# Patient Record
Sex: FEMALE | Race: WHITE | NOT HISPANIC OR LATINO | Employment: OTHER | URBAN - METROPOLITAN AREA
[De-identification: names, ages, dates, MRNs, and addresses within clinical notes are randomized per-mention and may not be internally consistent; named-entity substitution may affect disease eponyms.]

---

## 2023-03-17 DIAGNOSIS — N39.41 URINARY INCONTINENCE, URGE: ICD-10-CM

## 2023-03-17 DIAGNOSIS — N39.41 URINARY INCONTINENCE, URGE: Primary | ICD-10-CM

## 2023-03-17 RX ORDER — SOLIFENACIN SUCCINATE 10 MG/1
10 TABLET, FILM COATED ORAL DAILY
Qty: 30 TABLET | Refills: 11 | Status: SHIPPED | OUTPATIENT
Start: 2023-03-17 | End: 2023-03-17

## 2023-03-17 RX ORDER — SOLIFENACIN SUCCINATE 10 MG/1
TABLET, FILM COATED ORAL
Qty: 90 TABLET | Refills: 3 | Status: SHIPPED | OUTPATIENT
Start: 2023-03-17 | End: 2023-11-25

## 2023-03-22 DIAGNOSIS — Z00.00 ROUTINE GENERAL MEDICAL EXAMINATION AT A HEALTH CARE FACILITY: Primary | ICD-10-CM

## 2023-03-22 RX ORDER — HYDROCHLOROTHIAZIDE 25 MG/1
TABLET ORAL
Qty: 90 TABLET | Refills: 3 | Status: SHIPPED | OUTPATIENT
Start: 2023-03-22 | End: 2024-03-19 | Stop reason: SDUPTHER

## 2023-03-22 RX ORDER — ATORVASTATIN CALCIUM 40 MG/1
TABLET, FILM COATED ORAL
Qty: 90 TABLET | Refills: 3 | Status: SHIPPED | OUTPATIENT
Start: 2023-03-22 | End: 2024-03-19 | Stop reason: SDUPTHER

## 2023-05-17 ENCOUNTER — OFFICE VISIT (OUTPATIENT)
Dept: PRIMARY CARE | Facility: CLINIC | Age: 78
End: 2023-05-17
Payer: MEDICARE

## 2023-05-17 VITALS — DIASTOLIC BLOOD PRESSURE: 72 MMHG | SYSTOLIC BLOOD PRESSURE: 125 MMHG

## 2023-05-17 DIAGNOSIS — R22.0 SCALP LUMP: Primary | ICD-10-CM

## 2023-05-17 PROCEDURE — 99213 OFFICE O/P EST LOW 20 MIN: CPT | Performed by: FAMILY MEDICINE

## 2023-05-17 NOTE — PROGRESS NOTES
Subjective   Patient ID: Jen Johnson is a 78 y.o. female who presents for lump on scalp    HPI   pt has had a recurrent scalp lump with mild tenderness. Pt requested removal      Review of Systems    Objective   /72     Physical Exam  No acute distress. no cervical or axillary lymphadenopathy,  Lungs: CTA b/l, Heart: RRR, there was a 0.8x0.8 cm subcutaneous lump on scalp with mild  tenderness but no  bleeding, Patient walks with a good balance.     Assessment/Plan     Recurrent subcutaneous scalp lump. Surgeon dianne.

## 2023-07-07 ENCOUNTER — HOSPITAL ENCOUNTER (OUTPATIENT)
Dept: DATA CONVERSION | Facility: HOSPITAL | Age: 78
End: 2023-07-07
Attending: SURGERY | Admitting: SURGERY
Payer: MEDICARE

## 2023-07-07 DIAGNOSIS — L72.11 PILAR CYST: ICD-10-CM

## 2023-07-07 DIAGNOSIS — L72.3 SEBACEOUS CYST: ICD-10-CM

## 2023-07-07 DIAGNOSIS — R22.0 LOCALIZED SWELLING, MASS AND LUMP, HEAD: ICD-10-CM

## 2023-07-13 ENCOUNTER — OFFICE VISIT (OUTPATIENT)
Dept: PRIMARY CARE | Facility: CLINIC | Age: 78
End: 2023-07-13
Payer: MEDICARE

## 2023-07-13 VITALS
SYSTOLIC BLOOD PRESSURE: 129 MMHG | RESPIRATION RATE: 14 BRPM | BODY MASS INDEX: 34.33 KG/M2 | HEART RATE: 76 BPM | DIASTOLIC BLOOD PRESSURE: 81 MMHG | WEIGHT: 200 LBS

## 2023-07-13 DIAGNOSIS — E87.6 HYPOKALEMIA: ICD-10-CM

## 2023-07-13 DIAGNOSIS — R05.3 CHRONIC COUGH: ICD-10-CM

## 2023-07-13 DIAGNOSIS — E11.9 TYPE 2 DIABETES MELLITUS WITHOUT COMPLICATION, WITHOUT LONG-TERM CURRENT USE OF INSULIN (MULTI): ICD-10-CM

## 2023-07-13 DIAGNOSIS — Z00.00 ROUTINE MEDICAL EXAM: ICD-10-CM

## 2023-07-13 DIAGNOSIS — I10 PRIMARY HYPERTENSION: ICD-10-CM

## 2023-07-13 DIAGNOSIS — N39.41 URGE INCONTINENCE OF URINE: ICD-10-CM

## 2023-07-13 DIAGNOSIS — E78.00 PURE HYPERCHOLESTEROLEMIA: Primary | ICD-10-CM

## 2023-07-13 PROBLEM — E78.5 HYPERLIPIDEMIA: Status: ACTIVE | Noted: 2023-07-13

## 2023-07-13 PROBLEM — J30.9 ALLERGIC RHINITIS: Status: ACTIVE | Noted: 2023-07-13

## 2023-07-13 PROBLEM — D05.11 DUCTAL CARCINOMA IN SITU (DCIS) OF RIGHT BREAST: Status: ACTIVE | Noted: 2023-07-13

## 2023-07-13 PROCEDURE — 1159F MED LIST DOCD IN RCRD: CPT | Performed by: FAMILY MEDICINE

## 2023-07-13 PROCEDURE — 3074F SYST BP LT 130 MM HG: CPT | Performed by: FAMILY MEDICINE

## 2023-07-13 PROCEDURE — 3079F DIAST BP 80-89 MM HG: CPT | Performed by: FAMILY MEDICINE

## 2023-07-13 PROCEDURE — 99214 OFFICE O/P EST MOD 30 MIN: CPT | Performed by: FAMILY MEDICINE

## 2023-07-13 PROCEDURE — 1160F RVW MEDS BY RX/DR IN RCRD: CPT | Performed by: FAMILY MEDICINE

## 2023-07-13 PROCEDURE — 1170F FXNL STATUS ASSESSED: CPT | Performed by: FAMILY MEDICINE

## 2023-07-13 PROCEDURE — G0439 PPPS, SUBSEQ VISIT: HCPCS | Performed by: FAMILY MEDICINE

## 2023-07-13 PROCEDURE — 1036F TOBACCO NON-USER: CPT | Performed by: FAMILY MEDICINE

## 2023-07-13 RX ORDER — AMLODIPINE BESYLATE 5 MG/1
TABLET ORAL
COMMUNITY
Start: 2023-03-22 | End: 2023-08-15 | Stop reason: SDUPTHER

## 2023-07-13 RX ORDER — TAMOXIFEN CITRATE 20 MG/1
1 TABLET ORAL DAILY
COMMUNITY
Start: 2021-11-09

## 2023-07-13 RX ORDER — POTASSIUM CHLORIDE 1500 MG/1
2 TABLET, EXTENDED RELEASE ORAL DAILY
COMMUNITY
Start: 2022-07-20 | End: 2023-08-15 | Stop reason: SDUPTHER

## 2023-07-13 RX ORDER — AZELASTINE HCL 205.5 UG/1
SPRAY NASAL
COMMUNITY

## 2023-07-13 ASSESSMENT — ACTIVITIES OF DAILY LIVING (ADL)
GROCERY_SHOPPING: INDEPENDENT
MANAGING_FINANCES: INDEPENDENT
TAKING_MEDICATION: INDEPENDENT
BATHING: INDEPENDENT
DOING_HOUSEWORK: INDEPENDENT
DRESSING: INDEPENDENT

## 2023-07-13 ASSESSMENT — PATIENT HEALTH QUESTIONNAIRE - PHQ9
SUM OF ALL RESPONSES TO PHQ9 QUESTIONS 1 AND 2: 0
2. FEELING DOWN, DEPRESSED OR HOPELESS: NOT AT ALL
1. LITTLE INTEREST OR PLEASURE IN DOING THINGS: NOT AT ALL

## 2023-07-13 NOTE — PROGRESS NOTES
Subjective   Patient ID: Jen Johnson is a 78 y.o. female who presents for Medicare Annual Wellness Visit Subsequent (fu).    HPI   Patient has been compliant with taking all  current medications.No polydipsia, polyuria or significant weight changes. No lower extremities skin lesion. No LE numbness or tingling. No blurry vision. No GI upset  or muscle ache while on statin for high lipids. Normal appetite. Dry cough persisted. No GERD symptoms. No wt loss. No CP, HA, dizziness, heart palpitation. No claudication or cold LE.  No LE edema. No imbalance or falls. Good mood. Urge incontinence was controlled. No confusion or dry mouth    Review of Systems    Objective   /81   Pulse 76   Resp 14   Wt 90.7 kg (200 lb)   BMI 34.33 kg/m²     Physical Exam  NAD, well groomed, No sclera icterus. neck: supple, no cervical or axillary lymphadenopathy,  lungs: CTA b/l, heart: RRR, No LE edema, normal pedal pulses, abd: soft, no tenderness, BS+, normal strength and sensation  at bilateral lower extremities. No skin lesions at bilateral feet. Good balance. CNII-XII were grossly intact, good judgment and memory. No depressed mood.    Assessment/Plan     Medicare wellness visit: pt was capable of performing all his ADLs and IADLs. Pt has good memory and cognitive function. pt is obese. Recommend healthy diet and regular exercise. will monitor wt regularly. Advise eye exam by an OD yearly for glaucoma screen and dental exam every 6 months.   will monitor blood pressure, cholesterol levels and weight regularly. Recommend sunscreen application if exposed to the sun. Recommend shingle vaccines. Recommend pt to clear throw rugs at home and keep good lighting at night to avoid falls. Keep hot water for shower below 120F to avoid burn injury. recommend grab bar at bathtub.   pt stated that he had been taking all his medications as prescribed.   breast ca in situ. cont tamoxifen.   DMII, controlled with life style change. Check  A1C, urine albumin.   HTN with low K+, well controlled. Continue BP pills and kcl  Hyperlipidemia on statin. No GI upset or muscle ache. check labs for evaluation. f/u in 6mos  urge incontinence, controlled. cont solifenacin as dir. call if blurry vision, confusion or dry mouth occurs  chronic cough, pt declined to see a pulmonology. No GERD symptoms. Check cxr

## 2023-07-17 ENCOUNTER — LAB (OUTPATIENT)
Dept: LAB | Facility: LAB | Age: 78
End: 2023-07-17
Payer: MEDICARE

## 2023-07-17 DIAGNOSIS — E78.00 PURE HYPERCHOLESTEROLEMIA: ICD-10-CM

## 2023-07-17 DIAGNOSIS — R05.3 CHRONIC COUGH: ICD-10-CM

## 2023-07-17 DIAGNOSIS — I10 PRIMARY HYPERTENSION: ICD-10-CM

## 2023-07-17 DIAGNOSIS — E11.9 TYPE 2 DIABETES MELLITUS WITHOUT COMPLICATION, WITHOUT LONG-TERM CURRENT USE OF INSULIN (MULTI): ICD-10-CM

## 2023-07-17 LAB
ALANINE AMINOTRANSFERASE (SGPT) (U/L) IN SER/PLAS: 28 U/L (ref 7–45)
ALBUMIN (G/DL) IN SER/PLAS: 3.7 G/DL (ref 3.4–5)
ALBUMIN (MG/L) IN URINE: <7 MG/L
ALBUMIN/CREATININE (UG/MG) IN URINE: NORMAL UG/MG CRT (ref 0–30)
ALKALINE PHOSPHATASE (U/L) IN SER/PLAS: 54 U/L (ref 33–136)
ANION GAP IN SER/PLAS: 13 MMOL/L (ref 10–20)
ASPARTATE AMINOTRANSFERASE (SGOT) (U/L) IN SER/PLAS: 26 U/L (ref 9–39)
BILIRUBIN TOTAL (MG/DL) IN SER/PLAS: 0.6 MG/DL (ref 0–1.2)
CALCIUM (MG/DL) IN SER/PLAS: 9.3 MG/DL (ref 8.6–10.3)
CARBON DIOXIDE, TOTAL (MMOL/L) IN SER/PLAS: 28 MMOL/L (ref 21–32)
CHLORIDE (MMOL/L) IN SER/PLAS: 106 MMOL/L (ref 98–107)
CHOLESTEROL (MG/DL) IN SER/PLAS: 108 MG/DL (ref 0–199)
CHOLESTEROL IN HDL (MG/DL) IN SER/PLAS: 33.2 MG/DL
CHOLESTEROL/HDL RATIO: 3.3
CREATININE (MG/DL) IN SER/PLAS: 0.7 MG/DL (ref 0.5–1.05)
CREATININE (MG/DL) IN URINE: 63.5 MG/DL (ref 20–320)
ERYTHROCYTE DISTRIBUTION WIDTH (RATIO) BY AUTOMATED COUNT: 13.3 % (ref 11.5–14.5)
ERYTHROCYTE MEAN CORPUSCULAR HEMOGLOBIN CONCENTRATION (G/DL) BY AUTOMATED: 33.2 G/DL (ref 32–36)
ERYTHROCYTE MEAN CORPUSCULAR VOLUME (FL) BY AUTOMATED COUNT: 92 FL (ref 80–100)
ERYTHROCYTES (10*6/UL) IN BLOOD BY AUTOMATED COUNT: 4.83 X10E12/L (ref 4–5.2)
ESTIMATED AVERAGE GLUCOSE FOR HBA1C: 137 MG/DL
GFR FEMALE: 89 ML/MIN/1.73M2
GLUCOSE (MG/DL) IN SER/PLAS: 102 MG/DL (ref 74–99)
HEMATOCRIT (%) IN BLOOD BY AUTOMATED COUNT: 44.6 % (ref 36–46)
HEMOGLOBIN (G/DL) IN BLOOD: 14.8 G/DL (ref 12–16)
HEMOGLOBIN A1C/HEMOGLOBIN TOTAL IN BLOOD: 6.4 %
LDL: 52 MG/DL (ref 0–99)
LEUKOCYTES (10*3/UL) IN BLOOD BY AUTOMATED COUNT: 6.6 X10E9/L (ref 4.4–11.3)
PLATELETS (10*3/UL) IN BLOOD AUTOMATED COUNT: 217 X10E9/L (ref 150–450)
POTASSIUM (MMOL/L) IN SER/PLAS: 4 MMOL/L (ref 3.5–5.3)
PROTEIN TOTAL: 6 G/DL (ref 6.4–8.2)
SODIUM (MMOL/L) IN SER/PLAS: 143 MMOL/L (ref 136–145)
TRIGLYCERIDE (MG/DL) IN SER/PLAS: 112 MG/DL (ref 0–149)
UREA NITROGEN (MG/DL) IN SER/PLAS: 16 MG/DL (ref 6–23)
VLDL: 22 MG/DL (ref 0–40)

## 2023-07-17 PROCEDURE — 82043 UR ALBUMIN QUANTITATIVE: CPT

## 2023-07-17 PROCEDURE — 80053 COMPREHEN METABOLIC PANEL: CPT

## 2023-07-17 PROCEDURE — 83036 HEMOGLOBIN GLYCOSYLATED A1C: CPT

## 2023-07-17 PROCEDURE — 82570 ASSAY OF URINE CREATININE: CPT

## 2023-07-17 PROCEDURE — 80061 LIPID PANEL: CPT

## 2023-07-17 PROCEDURE — 85027 COMPLETE CBC AUTOMATED: CPT

## 2023-07-17 PROCEDURE — 36415 COLL VENOUS BLD VENIPUNCTURE: CPT

## 2023-07-19 ENCOUNTER — TELEPHONE (OUTPATIENT)
Dept: PRIMARY CARE | Facility: CLINIC | Age: 78
End: 2023-07-19
Payer: MEDICARE

## 2023-07-19 RX ORDER — TAMOXIFEN CITRATE 20 MG/1
TABLET ORAL
OUTPATIENT
Start: 2023-07-19

## 2023-07-19 NOTE — TELEPHONE ENCOUNTER
Patient called for a refill on her Tamoxifen 20 mg 1x daily. Can we send this to [facility] in [place]? Thank you

## 2023-07-26 LAB
COMPLETE PATHOLOGY REPORT: NORMAL
CONVERTED CLINICAL DIAGNOSIS-HISTORY: NORMAL
CONVERTED FINAL DIAGNOSIS: NORMAL
CONVERTED FINAL REPORT PDF LINK TO COPY AND PASTE: NORMAL
CONVERTED GROSS DESCRIPTION: NORMAL

## 2023-08-15 ENCOUNTER — TELEPHONE (OUTPATIENT)
Dept: PRIMARY CARE | Facility: CLINIC | Age: 78
End: 2023-08-15
Payer: MEDICARE

## 2023-08-15 DIAGNOSIS — I10 PRIMARY HYPERTENSION: Primary | ICD-10-CM

## 2023-08-15 DIAGNOSIS — E87.6 HYPOKALEMIA: ICD-10-CM

## 2023-08-15 RX ORDER — POTASSIUM CHLORIDE 1500 MG/1
40 TABLET, EXTENDED RELEASE ORAL DAILY
Qty: 180 TABLET | Refills: 3 | Status: SHIPPED | OUTPATIENT
Start: 2023-08-15 | End: 2023-09-16

## 2023-08-15 RX ORDER — AMLODIPINE BESYLATE 5 MG/1
TABLET ORAL
Qty: 90 TABLET | Refills: 3 | Status: SHIPPED | OUTPATIENT
Start: 2023-08-15 | End: 2024-04-01 | Stop reason: SDUPTHER

## 2023-08-15 NOTE — TELEPHONE ENCOUNTER
Patient needs refills sent to [facility] in [place]    Amlodipine 5 mg 1x daily    Potassium chloride 20 meq 2 tablets daily

## 2023-08-28 ENCOUNTER — TELEPHONE (OUTPATIENT)
Dept: PRIMARY CARE | Facility: CLINIC | Age: 78
End: 2023-08-28
Payer: MEDICARE

## 2023-09-16 DIAGNOSIS — E87.6 HYPOKALEMIA: ICD-10-CM

## 2023-09-16 RX ORDER — POTASSIUM CHLORIDE 1500 MG/1
40 TABLET, EXTENDED RELEASE ORAL DAILY
Qty: 180 TABLET | Refills: 3 | Status: SHIPPED | OUTPATIENT
Start: 2023-09-16

## 2023-09-29 VITALS
DIASTOLIC BLOOD PRESSURE: 79 MMHG | TEMPERATURE: 97.9 F | SYSTOLIC BLOOD PRESSURE: 172 MMHG | HEART RATE: 65 BPM | RESPIRATION RATE: 16 BRPM

## 2023-09-30 NOTE — H&P
History of Present Illness:   History Present Illness:  Reason for surgery: scalp cyst   HPI:    subcutaneousalp cyst   scheduled for excision today     Allergies:        Allergies:  ·  Nickel : Other, Rash  ·  Pollen : Other  ·  Tape  - Adhesive, Bandaids, Paper: Rash, Itching  ·  No Known  Drug Allergies: Unknown, [facility]    Home Medication Review:   Home Medications Reviewed: yes     Impression/Procedure:   ·  Impression and Planned Procedure: scalp cyst   scheduled for excision today       ERAS (Enhanced Recovery After Surgery):  ·  ERAS Patient: no       Vital Signs:  Temperature C: 36.6 degrees C   Temperature F: 97.8 degrees F   Heart Rate: 65 beats per minute   Respiratory Rate: 16 breath per minute   Blood Pressure Systolic: 172 mm/Hg   Blood Pressure Diastolic: 79 mm/Hg     Physical Exam by System:    Respiratory/Thorax: Patent airways, CTAB   Cardiovascular: Regular,     Consent:   COVID-19 Consent:  ·  COVID-19 Risk Consent Surgeon has reviewed key risks related to the risk of santos COVID-19 and if they contract COVID-19 what the risks are.       Electronic Signatures:  Karen Waters)  (Signed 07-Jul-2023 07:47)   Authored: History of Present Illness, Allergies, Home  Medication Review, Impression/Procedure, ERAS, Physical Exam, Consent, Note Completion      Last Updated: 07-Jul-2023 07:47 by Karen Waters)

## 2023-10-02 NOTE — OP NOTE
Post Operative Note:     PreOp Diagnosis: scap cyst   Post-Procedure Diagnosis: same   Procedure: 1. excision of scalp cyst   2.   3.   4.   5.   Surgeon: Evelin   Resident/Fellow/Other Assistant:    Anesthesia: local   Estimated Blood Loss (mL): 2   Specimen: yes   Complications: none   Findings: see below   Patient Returned To/Condition: pacu, stable   Additional Details: Informed consent was obtained.   The patient was placed lesioned in a sitting position on the open table.  The area was shaved.  It was then prepped and draped in sterile fashion.  Skin incision was marked out over the lesion.  Local anesthetics was injected.  Skin incision was made  which was carried down through the subcutaneous tissue.  The cyst was identified and dissected out completely.  The cyst was removed in its entirety.  It was consistent with an epidermal inclusion cyst.  Hemostasis was confirmed.  The wound was then closed  in layers: 3-0 Vicryl for the subcutaneous tissue and 3-0 nylon for the skin.  Bacitracin ointment was applied.  The wound was covered with dressing.    The patient tolerated procedure well.     The cyst was about 1.4 cm in size.  The incision about 2.3 centimeter in length.       Electronic Signatures:  Karen Waters)  (Signed 07-Jul-2023 10:18)   Authored: Post Operative Note, Note Completion      Last Updated: 07-Jul-2023 10:18 by Karen Waters)

## 2023-11-25 DIAGNOSIS — N39.41 URINARY INCONTINENCE, URGE: ICD-10-CM

## 2023-11-25 RX ORDER — SOLIFENACIN SUCCINATE 10 MG/1
10 TABLET, FILM COATED ORAL DAILY
Qty: 90 TABLET | Refills: 0 | Status: SHIPPED | OUTPATIENT
Start: 2023-11-25 | End: 2024-06-07 | Stop reason: SDUPTHER

## 2024-01-15 ENCOUNTER — OFFICE VISIT (OUTPATIENT)
Dept: PRIMARY CARE | Facility: CLINIC | Age: 79
End: 2024-01-15
Payer: MEDICARE

## 2024-01-15 VITALS — SYSTOLIC BLOOD PRESSURE: 128 MMHG | HEART RATE: 76 BPM | RESPIRATION RATE: 14 BRPM | DIASTOLIC BLOOD PRESSURE: 76 MMHG

## 2024-01-15 DIAGNOSIS — N39.41 URGE INCONTINENCE OF URINE: ICD-10-CM

## 2024-01-15 DIAGNOSIS — E11.9 TYPE 2 DIABETES MELLITUS WITHOUT COMPLICATION, WITHOUT LONG-TERM CURRENT USE OF INSULIN (MULTI): Primary | ICD-10-CM

## 2024-01-15 DIAGNOSIS — I10 PRIMARY HYPERTENSION: ICD-10-CM

## 2024-01-15 DIAGNOSIS — E78.00 PURE HYPERCHOLESTEROLEMIA: ICD-10-CM

## 2024-01-15 PROCEDURE — 1036F TOBACCO NON-USER: CPT | Performed by: FAMILY MEDICINE

## 2024-01-15 PROCEDURE — 3078F DIAST BP <80 MM HG: CPT | Performed by: FAMILY MEDICINE

## 2024-01-15 PROCEDURE — 99214 OFFICE O/P EST MOD 30 MIN: CPT | Performed by: FAMILY MEDICINE

## 2024-01-15 PROCEDURE — 3074F SYST BP LT 130 MM HG: CPT | Performed by: FAMILY MEDICINE

## 2024-01-15 NOTE — ASSESSMENT & PLAN NOTE
Hyperlipidemia on statin. No GI upset or muscle ache. check labs for evaluation.  Health diet and regular exercise. Decrease calorie intake to lose wt.  f/u in 6 mos.

## 2024-01-15 NOTE — ASSESSMENT & PLAN NOTE
DMII, well controlled without  medications. Check A1C, urine albumin. advise eye exam by an OD yearly and checking bilateral feet for skin lesions qhs. Healthy diet and regular exercise. Decrease calorie intake to lose wt.

## 2024-01-17 ENCOUNTER — LAB (OUTPATIENT)
Dept: LAB | Facility: LAB | Age: 79
End: 2024-01-17
Payer: MEDICARE

## 2024-01-17 DIAGNOSIS — E11.9 TYPE 2 DIABETES MELLITUS WITHOUT COMPLICATION, WITHOUT LONG-TERM CURRENT USE OF INSULIN (MULTI): ICD-10-CM

## 2024-01-17 DIAGNOSIS — E78.00 PURE HYPERCHOLESTEROLEMIA: ICD-10-CM

## 2024-01-17 LAB
ALBUMIN SERPL BCP-MCNC: 3.8 G/DL (ref 3.4–5)
ALP SERPL-CCNC: 56 U/L (ref 33–136)
ALT SERPL W P-5'-P-CCNC: 24 U/L (ref 7–45)
ANION GAP SERPL CALC-SCNC: 13 MMOL/L (ref 10–20)
AST SERPL W P-5'-P-CCNC: 25 U/L (ref 9–39)
BILIRUB SERPL-MCNC: 0.7 MG/DL (ref 0–1.2)
BUN SERPL-MCNC: 14 MG/DL (ref 6–23)
CALCIUM SERPL-MCNC: 9.2 MG/DL (ref 8.6–10.3)
CHLORIDE SERPL-SCNC: 105 MMOL/L (ref 98–107)
CHOLEST SERPL-MCNC: 101 MG/DL (ref 0–199)
CHOLESTEROL/HDL RATIO: 3.3
CO2 SERPL-SCNC: 27 MMOL/L (ref 21–32)
CREAT SERPL-MCNC: 0.67 MG/DL (ref 0.5–1.05)
CREAT UR-MCNC: 82.6 MG/DL (ref 20–320)
EGFRCR SERPLBLD CKD-EPI 2021: >90 ML/MIN/1.73M*2
EST. AVERAGE GLUCOSE BLD GHB EST-MCNC: 128 MG/DL
GLUCOSE SERPL-MCNC: 110 MG/DL (ref 74–99)
HBA1C MFR BLD: 6.1 %
HDLC SERPL-MCNC: 30.7 MG/DL
LDLC SERPL CALC-MCNC: 52 MG/DL
MICROALBUMIN UR-MCNC: 7 MG/L
MICROALBUMIN/CREAT UR: 8.5 UG/MG CREAT
NON HDL CHOLESTEROL: 70 MG/DL (ref 0–149)
POTASSIUM SERPL-SCNC: 4.1 MMOL/L (ref 3.5–5.3)
PROT SERPL-MCNC: 6.2 G/DL (ref 6.4–8.2)
SODIUM SERPL-SCNC: 141 MMOL/L (ref 136–145)
TRIGL SERPL-MCNC: 90 MG/DL (ref 0–149)
VLDL: 18 MG/DL (ref 0–40)

## 2024-01-17 PROCEDURE — 80053 COMPREHEN METABOLIC PANEL: CPT

## 2024-01-17 PROCEDURE — 80061 LIPID PANEL: CPT

## 2024-01-17 PROCEDURE — 36415 COLL VENOUS BLD VENIPUNCTURE: CPT

## 2024-01-17 PROCEDURE — 82570 ASSAY OF URINE CREATININE: CPT

## 2024-01-17 PROCEDURE — 82043 UR ALBUMIN QUANTITATIVE: CPT

## 2024-01-17 PROCEDURE — 83036 HEMOGLOBIN GLYCOSYLATED A1C: CPT

## 2024-03-01 DIAGNOSIS — D05.11 DUCTAL CARCINOMA IN SITU (DCIS) OF RIGHT BREAST: Primary | ICD-10-CM

## 2024-03-05 ENCOUNTER — LAB (OUTPATIENT)
Dept: LAB | Facility: HOSPITAL | Age: 79
End: 2024-03-05
Payer: MEDICARE

## 2024-03-05 ENCOUNTER — OFFICE VISIT (OUTPATIENT)
Dept: HEMATOLOGY/ONCOLOGY | Facility: CLINIC | Age: 79
End: 2024-03-05
Payer: MEDICARE

## 2024-03-05 VITALS
HEART RATE: 64 BPM | OXYGEN SATURATION: 94 % | WEIGHT: 198.52 LBS | BODY MASS INDEX: 33.89 KG/M2 | HEIGHT: 64 IN | SYSTOLIC BLOOD PRESSURE: 158 MMHG | DIASTOLIC BLOOD PRESSURE: 81 MMHG | TEMPERATURE: 98.4 F | RESPIRATION RATE: 16 BRPM

## 2024-03-05 DIAGNOSIS — D05.11 DUCTAL CARCINOMA IN SITU (DCIS) OF RIGHT BREAST: ICD-10-CM

## 2024-03-05 DIAGNOSIS — D05.11 DUCTAL CARCINOMA IN SITU (DCIS) OF RIGHT BREAST: Primary | ICD-10-CM

## 2024-03-05 LAB
ALBUMIN SERPL BCP-MCNC: 3.5 G/DL (ref 3.4–5)
ALP SERPL-CCNC: 52 U/L (ref 33–136)
ALT SERPL W P-5'-P-CCNC: 23 U/L (ref 7–45)
ANION GAP SERPL CALC-SCNC: 11 MMOL/L (ref 10–20)
AST SERPL W P-5'-P-CCNC: 22 U/L (ref 9–39)
BASOPHILS # BLD AUTO: 0.02 X10*3/UL (ref 0–0.1)
BASOPHILS NFR BLD AUTO: 0.3 %
BILIRUB SERPL-MCNC: 0.6 MG/DL (ref 0–1.2)
BUN SERPL-MCNC: 16 MG/DL (ref 6–23)
CALCIUM SERPL-MCNC: 8.8 MG/DL (ref 8.6–10.3)
CHLORIDE SERPL-SCNC: 106 MMOL/L (ref 98–107)
CO2 SERPL-SCNC: 27 MMOL/L (ref 21–32)
CREAT SERPL-MCNC: 0.71 MG/DL (ref 0.5–1.05)
EGFRCR SERPLBLD CKD-EPI 2021: 88 ML/MIN/1.73M*2
EOSINOPHIL # BLD AUTO: 0.11 X10*3/UL (ref 0–0.4)
EOSINOPHIL NFR BLD AUTO: 1.7 %
ERYTHROCYTE [DISTWIDTH] IN BLOOD BY AUTOMATED COUNT: 13.2 % (ref 11.5–14.5)
GLUCOSE SERPL-MCNC: 180 MG/DL (ref 74–99)
HCT VFR BLD AUTO: 41.5 % (ref 36–46)
HGB BLD-MCNC: 14 G/DL (ref 12–16)
IMM GRANULOCYTES # BLD AUTO: 0.01 X10*3/UL (ref 0–0.5)
IMM GRANULOCYTES NFR BLD AUTO: 0.2 % (ref 0–0.9)
LYMPHOCYTES # BLD AUTO: 1.81 X10*3/UL (ref 0.8–3)
LYMPHOCYTES NFR BLD AUTO: 27.3 %
MCH RBC QN AUTO: 30.8 PG (ref 26–34)
MCHC RBC AUTO-ENTMCNC: 33.7 G/DL (ref 32–36)
MCV RBC AUTO: 91 FL (ref 80–100)
MONOCYTES # BLD AUTO: 0.43 X10*3/UL (ref 0.05–0.8)
MONOCYTES NFR BLD AUTO: 6.5 %
NEUTROPHILS # BLD AUTO: 4.26 X10*3/UL (ref 1.6–5.5)
NEUTROPHILS NFR BLD AUTO: 64 %
PLATELET # BLD AUTO: 194 X10*3/UL (ref 150–450)
POTASSIUM SERPL-SCNC: 3.7 MMOL/L (ref 3.5–5.3)
PROT SERPL-MCNC: 6 G/DL (ref 6.4–8.2)
RBC # BLD AUTO: 4.55 X10*6/UL (ref 4–5.2)
SODIUM SERPL-SCNC: 140 MMOL/L (ref 136–145)
WBC # BLD AUTO: 6.6 X10*3/UL (ref 4.4–11.3)

## 2024-03-05 PROCEDURE — 3079F DIAST BP 80-89 MM HG: CPT | Performed by: INTERNAL MEDICINE

## 2024-03-05 PROCEDURE — 85025 COMPLETE CBC W/AUTO DIFF WBC: CPT | Performed by: INTERNAL MEDICINE

## 2024-03-05 PROCEDURE — 36415 COLL VENOUS BLD VENIPUNCTURE: CPT

## 2024-03-05 PROCEDURE — 80053 COMPREHEN METABOLIC PANEL: CPT | Performed by: INTERNAL MEDICINE

## 2024-03-05 PROCEDURE — 1126F AMNT PAIN NOTED NONE PRSNT: CPT | Performed by: INTERNAL MEDICINE

## 2024-03-05 PROCEDURE — 1159F MED LIST DOCD IN RCRD: CPT | Performed by: INTERNAL MEDICINE

## 2024-03-05 PROCEDURE — 3077F SYST BP >= 140 MM HG: CPT | Performed by: INTERNAL MEDICINE

## 2024-03-05 PROCEDURE — 1036F TOBACCO NON-USER: CPT | Performed by: INTERNAL MEDICINE

## 2024-03-05 PROCEDURE — 99213 OFFICE O/P EST LOW 20 MIN: CPT | Performed by: INTERNAL MEDICINE

## 2024-03-05 PROCEDURE — 1160F RVW MEDS BY RX/DR IN RCRD: CPT | Performed by: INTERNAL MEDICINE

## 2024-03-05 ASSESSMENT — COLUMBIA-SUICIDE SEVERITY RATING SCALE - C-SSRS
6. HAVE YOU EVER DONE ANYTHING, STARTED TO DO ANYTHING, OR PREPARED TO DO ANYTHING TO END YOUR LIFE?: NO
1. IN THE PAST MONTH, HAVE YOU WISHED YOU WERE DEAD OR WISHED YOU COULD GO TO SLEEP AND NOT WAKE UP?: NO
2. HAVE YOU ACTUALLY HAD ANY THOUGHTS OF KILLING YOURSELF?: NO

## 2024-03-05 ASSESSMENT — PATIENT HEALTH QUESTIONNAIRE - PHQ9
2. FEELING DOWN, DEPRESSED OR HOPELESS: NOT AT ALL
1. LITTLE INTEREST OR PLEASURE IN DOING THINGS: NOT AT ALL
SUM OF ALL RESPONSES TO PHQ9 QUESTIONS 1 AND 2: 0

## 2024-03-05 ASSESSMENT — PAIN SCALES - GENERAL: PAINLEVEL: 0-NO PAIN

## 2024-03-05 NOTE — PROGRESS NOTES
Patient Visit Information:   Visit Type: Follow Up Visit      Cancer History:          Breast: Ductal Carcinoma in situ         AJCC Edition: 8th (AJCC), Diagnosis Date: Sep 2021, 0, pTis(DCIS) pNX cM0 G2     Treatment Synopsis:    August 18, 2021, digital mammogram did show calcification of right breast      August 25, 2021, stereotactic biopsy of right breast, pathology positive for ductal carcinoma in situ      September 16, 2021, localized needle lumpectomy of right breast      Final pathology did show ductal carcinoma in situ, measuring 0.7 cm, grade intermediate, solid comedo type with necrosis, margin clean more than 2 mm, ER positive [place] positive, associated with atypical ductal hyperplasia      Stage PTis N0 M0      Radiation oncology consultation noted and patient does not want adjuvant radiotherapy because of possible side effect.  Tamoxifen 20 mg p.o. daily, started in November 2021 8/21 2023, mammogram okay     History of Present Illness:      ID Statement:    CORINA CR is a 78 year old Female        Chief Complaint: Ductal carcinoma in situ of right  breast   Interval History:    Patient is a 76-year-old female with a history of hypertension, hypercholesterolemia, osteoarthritis went for routine digital mammogram and on August 18, 2021 which  did show calcification of right breast.        August 25, 2021 patient underwent for stereotactic biopsy of right breast final pathology did show ductal carcinoma in situ, intermediate grade.      September 16, 2021, underwent for needle localized lumpectomy and final pathology did show ductal carcinoma in situ, intermediate grade, size 0.7 cm, solid comedo type with necrosis, margins clear more than 2 mm, ER positive KS positive, associated with  atypical ductal hyperplasia.      Medical oncology consultation requested.      Postoperatively patient doing very well.      Family history negative for breast and ovarian cancer.      Medical records  reviewed and pathology report discussed with patient.      3/5/24  DCIS of right breast, status post lumpectomy, no radiotherapy, patient is taking tamoxifen 20 mg p.o. daily since November 2021.  Patient doing very well patient a few hot flashes, no arthritis, no muscular pain, no vaginal discharge   8/21/24 mammogram okay  Review of Systems:   Review of Systems:    No headache and dizziness      No chest pain or shortness of breath, no cough      No fever, no night sweats      No nausea vomiting      No abdominal pain      No diarrhea and constipation, no rectal bleeding      No vaginal discharge      Patient has some arthritis      Body weight is stable, still active        Allergies and Intolerances:       Allergies:         Nickel: Environment, Other, Rash, Active         Pollen: Environment, 26-Apr-2012, Other, Active         Tape - Adhesive, Bandaids, Paper: Environment, Rash, Itching,  Active         No Known Drug Allergies: Drug, Unknown, Active     Outpatient Medication Profile:  * Patient Currently Takes Medications as of 05-Sep-2023 08:47 documented in Structured Notes         tamoxifen 20 mg oral tablet : Last Dose Taken:  , 1 tab(s) orally once a day , Start Date: 07-Mar-2022         Ocuvite Adult 50+ Oral Capsule: Last Dose Taken:  , Start Date: 20-Aug-2021         Atorvastatin Calcium 40 MG Oral Tablet: Last Dose Taken:  , TAKE 1 TABLET  BY MOUTH DAILY, Start Date: 24-Sep-2019         amLODIPine Besylate 5 MG Oral Tablet: Last Dose Taken:  , TAKE 1 TABLET  BY MOUTH DAILY, Start Date: 26-Oct-2017         hydroCHLOROthiazide 25 MG Oral Tablet: Last Dose Taken:  , TAKE 1 TABLET  BY MOUTH DAILY, Start Date: 10-Dec-2012         Arthritis meds w/ MSM: Last Dose Taken:  , 1 tab(s) orally once a day         Azelastine HCl - 0.15 % Nasal Solution: Last Dose Taken:           Baby Aspirin CHEW: Last Dose Taken:           Melatonin: Last Dose Taken:  , 10 milligram(s) orally once a day, As Needed              Medical History:         Ductal carcinoma in situ (DCIS) of right breast with comedonecrosis : ICD-10: D05.11, Status: Active       Surg History:         Status post right hip replacement: ICD-10: Z96.641, Status:  Active         Total hip replacement prosthesis: Status: Active     Family History: No Family History items are recorded  in the problem list.      Social History:   Social Substance History:  ·  Smoking Status never smoker (1)   ·  Alcohol Use denies   ·  Drug Use denies   ·  Additional History     Past medical history: She has a positive NICOLE but did not have any symptoms prior to the test and has not had any sequelae, hypertension, hyperlipidemia, non-insulin-dependent  diabetes mellitus which is diet controlled, COPD.      Past surgical history: Excision of a basal cell carcinoma of her face in August of this year, a benign left breast biopsy many years ago, bilateral total hip arthroplasties and a cholecystectomy.      Social history: She is a  and comes to this appointment alone.  She is a retired nurse.      Gynecologic history: Menarche age 15.  G3, P3.  She delivered her first child at the age of 27.  She went through menopause at the age of 55.  She took oral contraceptive pills for 10 years and hormone replacement therapy for 10 years. (1)           Performance:   ECOG Performance Status: 0 Fully Active         Vitals and Measurements:   Vitals: Temp: 36.8  HR: 62  RR: 16  BP: 141/81  SPO2%:   93   Measurements: HT(cm): 162  WT(kg): 91.1  BSA: 2.02   BMI:  34.7   Last 3 Weights & Heights: Date:                           Weight/Scale Type:                    Height:   05-Sep-2023 08:46                91.1  kg                     162  cm  07-Mar-2023 12:40                93  kg                     162  cm  06-Sep-2022 13:32                91.5  kg                     162  cm      Physical Exam:      Constitutional: awake/alert/oriented x3, no distress,   Eyes: PERRL, EOMI, clear sclera    ENMT: mucous membranes moist, no apparent injury,   Head/Neck: Neck supple, no apparent injury, thyroid  without mass or tenderness, No JVD, trachea midline, no bruits   Respiratory/Thorax: Patent airways, CTAB, normal  breath sounds   Cardiovascular: Regular, rate and rhythm,  , normal  S 1and S 2   Gastrointestinal: Nondistended, soft, non-tender,   no masses palpable, no organomegaly, +BS,   Extremities: normal extremities, no cyanosis edema,    no clubbing   Neurological: alert and oriented x3, intact senses,  motor, response and reflexes, normal strength   Breast: Right breast examination did show surgical  scar, some tenderness, no mass     Left breast examination within normal limit   Lymphatic: No peripheral lymphadenopathy   Psychological: Appropriate mood and behavior   Skin: Warm and dry, no lesions, no rashes         Lab Results:       09:22  (3/5/24) 6 mo ago  (9/5/23) 7 mo ago  (7/17/23) 12 mo ago  (3/7/23) 1 yr ago  (9/6/22) 1 yr ago  (3/7/22) 2 yr ago  (7/16/21)    WBC  4.4 - 11.3 x10*3/uL 6.6 6.8 R 6.6 R 7.1 R 7.2 R 7.1 R 7.5 R   RBC  4.00 - 5.20 x10*6/uL 4.55 4.53 R 4.83 R 4.70 R 4.69 R 4.46 R 5.03 R   Hemoglobin  12.0 - 16.0 g/dL 14.0 14.0 14.8 14.1 14.7 13.9 15.3   Hematocrit  36.0 - 46.0 % 41.5 41.4 44.6 42.2 42.7 40.9 45.7   MCV  80 - 100 fL 91 91 92 90 91 92 91   MCH  26.0 - 34.0 pg 30.8         MCHC  32.0 - 36.0 g/dL 33.7 33.8 33.2 33.4 34.4 34.0 33.5   RDW  11.5 - 14.5 % 13.2 13.1 13.3 13.2 12.9 13.3 13.9   Platelets  150 - 450 x10*3/uL 194           ·  Results            Radiology Result:     ·  Results           Impression:             Assessment and Plan:      Assessment and Plan:   Assessment:    1.  DCIS of right breast      Patient is a 76-year-old female who was diagnosed DCIS of right breast status post lumpectomy, completely   resected, measuring 0.7 cm, intermediate grade, margin clean, ER positive, [place] positive, associated with atypical ductal hyperplasia.      No adjuvant  radiotherapy      Tamoxifen 20 mg p.o. daily started in November 2021       3/5/24     1.  DCIS of right breast      Patient is a 77-year-old female who was diagnosed DCIS of right breast status post lumpectomy, completely   resected, measuring 0.7 cm, intermediate grade, margin clean, ER positive, [place] positive, associated with atypical ductal hyperplasia.      No adjuvant radiotherapy      Tamoxifen 20 mg p.o. daily started in November 2021        Patient doing very well, lab result discussed with patient      Continue tamoxifen 20 mg p.o. daily      Mammogram within normal limit     Reevaluation after 6 months  Plan:    Plan as above      Discussed with the patient      Time spent 30 minute.

## 2024-03-19 DIAGNOSIS — Z00.00 ROUTINE GENERAL MEDICAL EXAMINATION AT A HEALTH CARE FACILITY: ICD-10-CM

## 2024-03-19 RX ORDER — HYDROCHLOROTHIAZIDE 25 MG/1
25 TABLET ORAL DAILY
Qty: 100 TABLET | Refills: 3 | Status: SHIPPED | OUTPATIENT
Start: 2024-03-19

## 2024-03-19 RX ORDER — ATORVASTATIN CALCIUM 40 MG/1
40 TABLET, FILM COATED ORAL DAILY
Qty: 100 TABLET | Refills: 3 | Status: SHIPPED | OUTPATIENT
Start: 2024-03-19

## 2024-04-01 DIAGNOSIS — I10 PRIMARY HYPERTENSION: ICD-10-CM

## 2024-04-01 RX ORDER — AMLODIPINE BESYLATE 5 MG/1
TABLET ORAL
Qty: 100 TABLET | Refills: 3 | Status: SHIPPED | OUTPATIENT
Start: 2024-04-01 | End: 2024-04-03 | Stop reason: SDUPTHER

## 2024-04-03 ENCOUNTER — TELEPHONE (OUTPATIENT)
Dept: PRIMARY CARE | Facility: CLINIC | Age: 79
End: 2024-04-03
Payer: MEDICARE

## 2024-04-03 RX ORDER — AMLODIPINE BESYLATE 5 MG/1
TABLET ORAL
Qty: 100 TABLET | Refills: 3 | Status: SHIPPED | OUTPATIENT
Start: 2024-04-03

## 2024-06-07 ENCOUNTER — TELEPHONE (OUTPATIENT)
Dept: PRIMARY CARE | Facility: CLINIC | Age: 79
End: 2024-06-07
Payer: MEDICARE

## 2024-06-07 DIAGNOSIS — N39.41 URINARY INCONTINENCE, URGE: ICD-10-CM

## 2024-06-07 RX ORDER — SOLIFENACIN SUCCINATE 10 MG/1
10 TABLET, FILM COATED ORAL DAILY
Qty: 90 TABLET | Refills: 3 | Status: SHIPPED | OUTPATIENT
Start: 2024-06-07

## 2024-06-07 NOTE — TELEPHONE ENCOUNTER
solifenacin (Vesicare) 10 mg tablet// Take 1 tablet (10 mg) by mouth once daily. Swallow tablet whole; do not crush, chew, or split.  Aline Maria T

## 2024-07-15 ENCOUNTER — APPOINTMENT (OUTPATIENT)
Dept: PRIMARY CARE | Facility: CLINIC | Age: 79
End: 2024-07-15
Payer: MEDICARE

## 2024-07-15 VITALS
HEIGHT: 64 IN | SYSTOLIC BLOOD PRESSURE: 130 MMHG | RESPIRATION RATE: 14 BRPM | DIASTOLIC BLOOD PRESSURE: 76 MMHG | WEIGHT: 185 LBS | HEART RATE: 68 BPM | BODY MASS INDEX: 31.58 KG/M2

## 2024-07-15 DIAGNOSIS — E87.6 HYPOKALEMIA: ICD-10-CM

## 2024-07-15 DIAGNOSIS — J30.9 ALLERGIC RHINITIS, UNSPECIFIED SEASONALITY, UNSPECIFIED TRIGGER: ICD-10-CM

## 2024-07-15 DIAGNOSIS — Z00.00 ROUTINE MEDICAL EXAM: ICD-10-CM

## 2024-07-15 DIAGNOSIS — E78.00 PURE HYPERCHOLESTEROLEMIA: ICD-10-CM

## 2024-07-15 DIAGNOSIS — E11.9 TYPE 2 DIABETES MELLITUS WITHOUT COMPLICATION, WITHOUT LONG-TERM CURRENT USE OF INSULIN (MULTI): Primary | ICD-10-CM

## 2024-07-15 DIAGNOSIS — I10 PRIMARY HYPERTENSION: ICD-10-CM

## 2024-07-15 DIAGNOSIS — R05.3 CHRONIC COUGH: ICD-10-CM

## 2024-07-15 PROBLEM — R22.0 SCALP LUMP: Status: RESOLVED | Noted: 2023-05-17 | Resolved: 2024-07-15

## 2024-07-15 PROCEDURE — 1158F ADVNC CARE PLAN TLK DOCD: CPT | Performed by: FAMILY MEDICINE

## 2024-07-15 PROCEDURE — 1123F ACP DISCUSS/DSCN MKR DOCD: CPT | Performed by: FAMILY MEDICINE

## 2024-07-15 PROCEDURE — G0439 PPPS, SUBSEQ VISIT: HCPCS | Performed by: FAMILY MEDICINE

## 2024-07-15 PROCEDURE — 1036F TOBACCO NON-USER: CPT | Performed by: FAMILY MEDICINE

## 2024-07-15 PROCEDURE — 99214 OFFICE O/P EST MOD 30 MIN: CPT | Performed by: FAMILY MEDICINE

## 2024-07-15 PROCEDURE — 1170F FXNL STATUS ASSESSED: CPT | Performed by: FAMILY MEDICINE

## 2024-07-15 PROCEDURE — 3075F SYST BP GE 130 - 139MM HG: CPT | Performed by: FAMILY MEDICINE

## 2024-07-15 PROCEDURE — 1159F MED LIST DOCD IN RCRD: CPT | Performed by: FAMILY MEDICINE

## 2024-07-15 PROCEDURE — 1160F RVW MEDS BY RX/DR IN RCRD: CPT | Performed by: FAMILY MEDICINE

## 2024-07-15 PROCEDURE — 3078F DIAST BP <80 MM HG: CPT | Performed by: FAMILY MEDICINE

## 2024-07-15 RX ORDER — POTASSIUM CHLORIDE 20 MEQ/1
40 TABLET, EXTENDED RELEASE ORAL DAILY
Qty: 180 TABLET | Refills: 3 | Status: SHIPPED | OUTPATIENT
Start: 2024-07-15

## 2024-07-15 ASSESSMENT — ACTIVITIES OF DAILY LIVING (ADL)
GROCERY_SHOPPING: INDEPENDENT
DOING_HOUSEWORK: INDEPENDENT
DRESSING: INDEPENDENT
MANAGING_FINANCES: INDEPENDENT
BATHING: INDEPENDENT
TAKING_MEDICATION: INDEPENDENT

## 2024-07-15 ASSESSMENT — PATIENT HEALTH QUESTIONNAIRE - PHQ9
2. FEELING DOWN, DEPRESSED OR HOPELESS: NOT AT ALL
SUM OF ALL RESPONSES TO PHQ9 QUESTIONS 1 AND 2: 0
1. LITTLE INTEREST OR PLEASURE IN DOING THINGS: NOT AT ALL

## 2024-07-15 NOTE — PROGRESS NOTES
"Subjective   Patient ID: Vianey Johnson is a 79 y.o. female who presents for Medicare Annual Wellness Visit Subsequent (fu).    HPI   Patient has been compliant with taking all  current medications.  No polydipsia, polyuria or significant weight changes. No lower extremities skin lesion. No LE numbness or tingling. No blurry vision. No GI upset  or muscle ache while on statin for high lipids. Normal appetite. Runny nose and Sneezing were controlled. Pt cont to have dry cough in am. No wt loss or hemoptysis. No CP, HA, dizziness, heart palpitation. No claudication or cold LE.  No LE edema. No imbalance or falls. Good mood.     Review of Systems    Objective   /76   Pulse 68   Resp 14   Ht 1.626 m (5' 4\")   Wt 83.9 kg (185 lb)   BMI 31.76 kg/m²     Physical Exam  NAD, well groomed, No sclera icterus. No nasal discharge, neck: supple, no cervical or axillary lymphadenopathy,  lungs: CTA b/l, heart: RRR, No LE edema, normal pedal pulses, abd: soft, no tenderness, BS+, normal strength and sensation  at bilateral lower extremities. No skin lesions at bilateral feet.  No skin rashes, Good balance. CNII-XII were grossly intact, good judgment and memory. No depressed mood.    Assessment/Plan     Medicare wellness visit: pt was capable of performing all his ADLs and IADLs. Pt has good memory and cognitive function. pt is obese. Recommend healthy diet and regular exercise. will monitor wt regularly. Advise eye exam by an OD yearly for glaucoma screen and dental exam every 6 months.   will monitor blood pressure, cholesterol levels and weight regularly. Recommend sunscreen application if exposed to the sun. Recommend Tdap. Recommend pt to clear throw rugs at home and keep good lighting at night to avoid falls. Keep hot water for shower below 120F to avoid burn injury.   DMII, controlled with life style change. Check A1C, urine albumin.   HTN with low K+, well controlled. Continue BP pills and kcl  Hyperlipidemia on " statin. No GI upset or muscle ache. check labs for evaluation. f/u in 6mos  allergic rhinitis, controlled. Cont azelastine nasal sprays. Recommend saline nasal sprays   chronic cough,  persistent. Ent eval

## 2024-07-17 ENCOUNTER — LAB (OUTPATIENT)
Dept: LAB | Facility: LAB | Age: 79
End: 2024-07-17
Payer: MEDICARE

## 2024-07-17 DIAGNOSIS — I10 PRIMARY HYPERTENSION: ICD-10-CM

## 2024-07-17 DIAGNOSIS — E11.9 TYPE 2 DIABETES MELLITUS WITHOUT COMPLICATION, WITHOUT LONG-TERM CURRENT USE OF INSULIN (MULTI): Primary | ICD-10-CM

## 2024-07-17 DIAGNOSIS — E78.00 PURE HYPERCHOLESTEROLEMIA: ICD-10-CM

## 2024-07-17 DIAGNOSIS — E11.9 TYPE 2 DIABETES MELLITUS WITHOUT COMPLICATION, WITHOUT LONG-TERM CURRENT USE OF INSULIN (MULTI): ICD-10-CM

## 2024-07-17 LAB
ALBUMIN SERPL BCP-MCNC: 3.6 G/DL (ref 3.4–5)
ALP SERPL-CCNC: 49 U/L (ref 33–136)
ALT SERPL W P-5'-P-CCNC: 22 U/L (ref 7–45)
ANION GAP SERPL CALC-SCNC: 10 MMOL/L (ref 10–20)
AST SERPL W P-5'-P-CCNC: 22 U/L (ref 9–39)
BILIRUB SERPL-MCNC: 0.7 MG/DL (ref 0–1.2)
BUN SERPL-MCNC: 14 MG/DL (ref 6–23)
CALCIUM SERPL-MCNC: 9 MG/DL (ref 8.6–10.3)
CHLORIDE SERPL-SCNC: 107 MMOL/L (ref 98–107)
CHOLEST SERPL-MCNC: 102 MG/DL (ref 0–199)
CHOLESTEROL/HDL RATIO: 3.3
CO2 SERPL-SCNC: 28 MMOL/L (ref 21–32)
CREAT SERPL-MCNC: 0.68 MG/DL (ref 0.5–1.05)
CREAT UR-MCNC: 60.4 MG/DL (ref 20–320)
EGFRCR SERPLBLD CKD-EPI 2021: 90 ML/MIN/1.73M*2
ERYTHROCYTE [DISTWIDTH] IN BLOOD BY AUTOMATED COUNT: 13.5 % (ref 11.5–14.5)
EST. AVERAGE GLUCOSE BLD GHB EST-MCNC: 134 MG/DL
GLUCOSE SERPL-MCNC: 108 MG/DL (ref 74–99)
HBA1C MFR BLD: 6.3 %
HCT VFR BLD AUTO: 42.1 % (ref 36–46)
HDLC SERPL-MCNC: 30.7 MG/DL
HGB BLD-MCNC: 14.1 G/DL (ref 12–16)
LDLC SERPL CALC-MCNC: 50 MG/DL
MCH RBC QN AUTO: 30.7 PG (ref 26–34)
MCHC RBC AUTO-ENTMCNC: 33.5 G/DL (ref 32–36)
MCV RBC AUTO: 92 FL (ref 80–100)
MICROALBUMIN UR-MCNC: <7 MG/L
MICROALBUMIN/CREAT UR: NORMAL MG/G{CREAT}
NON HDL CHOLESTEROL: 71 MG/DL (ref 0–149)
NRBC BLD-RTO: 0 /100 WBCS (ref 0–0)
PLATELET # BLD AUTO: 191 X10*3/UL (ref 150–450)
POTASSIUM SERPL-SCNC: 4 MMOL/L (ref 3.5–5.3)
PROT SERPL-MCNC: 5.7 G/DL (ref 6.4–8.2)
RBC # BLD AUTO: 4.6 X10*6/UL (ref 4–5.2)
SODIUM SERPL-SCNC: 141 MMOL/L (ref 136–145)
TRIGL SERPL-MCNC: 107 MG/DL (ref 0–149)
TSH SERPL-ACNC: 2.6 MIU/L (ref 0.44–3.98)
VLDL: 21 MG/DL (ref 0–40)
WBC # BLD AUTO: 6.5 X10*3/UL (ref 4.4–11.3)

## 2024-07-17 PROCEDURE — 82570 ASSAY OF URINE CREATININE: CPT

## 2024-07-17 PROCEDURE — 84443 ASSAY THYROID STIM HORMONE: CPT

## 2024-07-17 PROCEDURE — 85027 COMPLETE CBC AUTOMATED: CPT

## 2024-07-17 PROCEDURE — 80061 LIPID PANEL: CPT

## 2024-07-17 PROCEDURE — 82043 UR ALBUMIN QUANTITATIVE: CPT

## 2024-07-17 PROCEDURE — 80053 COMPREHEN METABOLIC PANEL: CPT

## 2024-07-17 PROCEDURE — 36415 COLL VENOUS BLD VENIPUNCTURE: CPT

## 2024-07-17 PROCEDURE — 83036 HEMOGLOBIN GLYCOSYLATED A1C: CPT

## 2024-08-15 ENCOUNTER — HOSPITAL ENCOUNTER (OUTPATIENT)
Dept: GASTROENTEROLOGY | Facility: HOSPITAL | Age: 79
Discharge: HOME | End: 2024-08-15
Payer: MEDICARE

## 2024-08-15 DIAGNOSIS — Z86.010 HX OF COLONIC POLYPS: ICD-10-CM

## 2024-08-15 DIAGNOSIS — Z12.11 SCREEN FOR COLON CANCER: ICD-10-CM

## 2024-08-22 ENCOUNTER — HOSPITAL ENCOUNTER (OUTPATIENT)
Dept: RADIOLOGY | Facility: HOSPITAL | Age: 79
Discharge: HOME | End: 2024-08-22
Payer: MEDICARE

## 2024-08-22 DIAGNOSIS — D05.11 INTRADUCTAL CARCINOMA IN SITU OF RIGHT BREAST: ICD-10-CM

## 2024-08-22 DIAGNOSIS — Z00.00 ENCOUNTER FOR GENERAL ADULT MEDICAL EXAMINATION WITHOUT ABNORMAL FINDINGS: ICD-10-CM

## 2024-08-22 DIAGNOSIS — R05.3 CHRONIC COUGH: ICD-10-CM

## 2024-08-22 PROCEDURE — 77067 SCR MAMMO BI INCL CAD: CPT

## 2024-08-22 PROCEDURE — 77063 BREAST TOMOSYNTHESIS BI: CPT | Mod: BILATERAL PROCEDURE | Performed by: RADIOLOGY

## 2024-08-22 PROCEDURE — 77067 SCR MAMMO BI INCL CAD: CPT | Mod: BILATERAL PROCEDURE | Performed by: RADIOLOGY

## 2024-08-22 PROCEDURE — 71046 X-RAY EXAM CHEST 2 VIEWS: CPT

## 2024-08-28 ENCOUNTER — APPOINTMENT (OUTPATIENT)
Dept: SURGERY | Facility: CLINIC | Age: 79
End: 2024-08-28
Payer: MEDICARE

## 2024-08-28 VITALS
SYSTOLIC BLOOD PRESSURE: 142 MMHG | BODY MASS INDEX: 32.01 KG/M2 | HEART RATE: 63 BPM | OXYGEN SATURATION: 93 % | WEIGHT: 187.5 LBS | DIASTOLIC BLOOD PRESSURE: 71 MMHG | HEIGHT: 64 IN

## 2024-08-28 DIAGNOSIS — Z12.31 ENCOUNTER FOR SCREENING MAMMOGRAM FOR BREAST CANCER: ICD-10-CM

## 2024-08-28 DIAGNOSIS — D05.11 DUCTAL CARCINOMA IN SITU (DCIS) OF RIGHT BREAST: Primary | ICD-10-CM

## 2024-08-28 PROCEDURE — 3078F DIAST BP <80 MM HG: CPT | Performed by: SURGERY

## 2024-08-28 PROCEDURE — 3077F SYST BP >= 140 MM HG: CPT | Performed by: SURGERY

## 2024-08-28 PROCEDURE — 1160F RVW MEDS BY RX/DR IN RCRD: CPT | Performed by: SURGERY

## 2024-08-28 PROCEDURE — 1159F MED LIST DOCD IN RCRD: CPT | Performed by: SURGERY

## 2024-08-28 PROCEDURE — 1036F TOBACCO NON-USER: CPT | Performed by: SURGERY

## 2024-08-28 PROCEDURE — 99213 OFFICE O/P EST LOW 20 MIN: CPT | Performed by: SURGERY

## 2024-08-28 RX ORDER — MINERAL OIL
1 ENEMA (ML) RECTAL DAILY
COMMUNITY
Start: 2024-08-21

## 2024-08-28 NOTE — PROGRESS NOTES
"    GENERAL SURGERY OFFICE NOTE    Patient: Jen Johnson    Age: 79 y.o.   Gender: female    MRN: [id number]    PCP: Michael Lafleur MD PhD        SUBJECTIVE     Chief Complaint  Follow-up (Patient is here for 1 year breast follow up.  Patient denies any new issues. 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 She currently denies any breast complaints. No new palpable masses, no nipple discharge and no associated pain. She notes that her right breast does feel slightly smaller compared to her left breast since surgery. She declined XRT, but is taking tamoxifen. She very rarely has a hot flash symptom. She denies any other systemic symptoms. Recent bilateral mammogram showed surgical changes without any radiographic evidence or concerns for malignancy. She continues to have issues with bilateral lower extremity edema which has been present now for more than 2 years.     Original risk factors for breast cancer: 76-year-old white female; menarche at age 15; first live birth at age 25; 1 previous left breast excisional biopsy with benign pathology; no family history of breast cancer; is postmenopausal and went through menopause in her early 50s; used hormone replacement therapy for approximately 10 years after menopause. This gives her a 5-year Orin score of 1.9% and a lifetime risk of 4.3% which puts her in a slightly less than average risk category. Using the Tyrer-Cuzick Breast cancer risk evaluation tool, this patient's 10-year risk of breast cancer is 3.6% ( average risk is 3.1%) and lifetime risk is 3.9% (average lifetime risk is 3.3%). This puts her in a average risk category for developing breast cancer.    ROS  Review of Systems   Constitutional: no fever,~no chills,~no recent weight gain~and~no recent weight loss.   Eyes: no eye symptoms, but~no loss of vision,~no discharge from the eyes,~no itching of the eyes~and~no eye pain.   ENT: no hearing " loss,~no neck pain~and~no hoarseness.   Cardiovascular: no chest pain,~no palpitations~and~no lower extremity edema.   Respiratory: no dyspnea,~no dyspnea during exertion~and~no cough.   Breast: no nipple discharge,~no breast mass~and~no pain in breast.   Gastrointestinal: no abdominal pain,~no constipation,~no heartburn,~no vomiting,~no blood in stools,~bowel movement frequency normal.   Genitourinary: no dysuria~and~no hematuria.   Musculoskeletal: no arthralgias,~no myalgias~and~no hernia.   Integumentary: no rashes~and~no skin lesions.   Neurological: no headache,~no dizziness,~no numbness,~no tingling~and~no limb weakness.   Psychiatric: no anxiety,~no depression~and~no emotional problems.   Endocrine: no heat or cold intolerance~and~no increased thirst.   Hematologic/Lymphatic: no tendency for easy bleeding~and~no tendency for easy bruising.   All other systems have been reviewed and are negative for complaint.     HISTORY     Past Medical History:   Diagnosis Date    Acute maxillary sinusitis, unspecified 03/02/2017    Acute maxillary sinusitis    Breast cancer (Multi) 2022    Right sided    Contusion of right upper arm, initial encounter 09/18/2018    Bruise of both arms    Fatty (change of) liver, not elsewhere classified 06/18/2019    Fatty liver disease, nonalcoholic    Localized swelling, mass and lump, head 08/09/2021    Lump on face    Melena 06/22/2015    Blood in stool    Other conditions influencing health status     Nephrolithiasis Of The Right Kidney    Other conditions influencing health status     Mammogram Left Breast Microcalcifications    Other seborrheic keratosis 10/26/2018    Verrucous keratosis    Other specified abnormal findings of blood chemistry 09/12/2019    Elevated LFTs    Pain in right foot 08/02/2021    Right foot pain    Pain in unspecified hip 12/18/2018    Hip pain    Personal history of diseases of the blood and blood-forming organs and certain disorders involving the immune  mechanism 08/22/2013    History of anemia    Personal history of diseases of the blood and blood-forming organs and certain disorders involving the immune mechanism 06/10/2013    History of anemia    Personal history of other benign neoplasm 09/26/2018    History of other benign neoplasm    Personal history of other diseases of the digestive system 06/18/2018    History of gastroesophageal reflux (GERD)    Personal history of other diseases of the nervous system and sense organs     History of sciatica    Personal history of other endocrine, nutritional and metabolic disease 07/16/2021    History of morbid obesity    Personal history of other endocrine, nutritional and metabolic disease 06/18/2019    History of hypokalemia    Personal history of other specified conditions 12/02/2015    History of wheezing    Personal history of urinary (tract) infections 06/24/2015    History of urinary tract infection    Personal history of urinary (tract) infections 09/02/2014    History of urinary tract infection    Unspecified asthma, uncomplicated ([facility]) 06/18/2018    Mild reactive airways disease    Urinary tract infection, site not specified     Acute UTI        Past Surgical History:   Procedure Laterality Date    ANTERIOR CRUCIATE LIGAMENT REPAIR  05/17/2013    Primary Repair Of Knee Ligament Cruciate Anterior    BI MAMMO GUIDED BREAST RIGHT LOCALIZATION Right 9/16/2021    BI MAMMO GUIDED LOCALIZATION BREAST RIGHT 9/16/2021 [facility]    CHOLECYSTECTOMY  05/17/2013    Cholecystectomy    COLONOSCOPY  05/17/2013    Complete Colonoscopy    OTHER SURGICAL HISTORY  10/01/2021    Lumpectomy    TOTAL HIP ARTHROPLASTY  05/17/2013    Hip Replacement    TOTAL KNEE ARTHROPLASTY  12/02/2015    Knee Replacement        Family History   Problem Relation Name Age of Onset    Asthma Mother      Diabetes Father      Psoriasis Father      Heart disease Father          No Known Allergies     Social History     Tobacco Use   Smoking  "Status Never   Smokeless Tobacco Never        Social History     Substance and Sexual Activity   Alcohol Use Never        HOME MEDICATIONS  Current Outpatient Medications   Medication Instructions    amLODIPine (Norvasc) 5 mg tablet 1 tab po qd    atorvastatin (LIPITOR) 40 mg, oral, Daily    azelastine 205.5 mcg (0.15 %) spray,non-aerosol nasal    fexofenadine (Allegra) 180 mg tablet 1 tablet, oral, Daily    hydroCHLOROthiazide (HYDRODIURIL) 25 mg, oral, Daily    potassium chloride CR 20 mEq ER tablet 40 mEq, oral, Daily    solifenacin (VESICARE) 10 mg, oral, Daily    tamoxifen (Nolvadex) 20 mg tablet 1 tablet, oral, Daily          OBJECTIVE   Last Recorded Vitals.  Blood pressure 142/71, pulse 63, height 1.626 m (5' 4\"), weight 85 kg (187 lb 8 oz), SpO2 93%.     PHYSICAL EXAM  Physical Exam   General: Well-developed, well-nourished and in no acute distress.  Head: Normocephalic. Atraumatic.  Neck/thyroid: Neck is supple. Normal thyroid without mass. No jugular venous distention.  Eyes: Pupils equal round and reactive to light. Conjunctiva normal.  ENMT: No masses or deformity of external nose. External ears without masses.  Respiratory/Chest: Normal respiratory effort.  Breast: Small, symmetrical breast. Scar at the 12 o'clock position of the left breast consistent with her previous excisional biopsy. No palpable masses of the left breast. On the right breast, there is a little bit of nodularity at the medial aspect at 3:00, and just both above and below the right nipple of the right breast. No expressible nipple discharge. On the right breast, there is a well healed surgical scar in the lower outer quadrant. No erythema and no bruising.  Lymphatics: No palpable abnormalities of the cervical, supraclavicular or axillary regions.  Cardiovascular: Regular rate and rhythm.   Abdomen: Soft, nontender, nondistended. No hernias.   Musculoskeletal: Joints and limbs are grossly normal. Normal gait. Normal range of motion " of major joints.  Neuro: Oriented to person, place and time. No obvious neurological deficit. Motor strength grossly normal.  Psych: Normal mood and affect.     RESULTS   Labs  No results found for this or any previous visit (from the past 24 hour(s)).    Radiology Resutls  BI mammo bilateral screening tomosynthesis  Status: Final result     PACS Images     Show images for BI mammo bilateral screening tomosynthesis  Signed by    Signed Time Phone Pager   Karthik Jovel MD 8/23/2024 10:17 [phone]     Exam Information    Status Exam Begun Exam Ended   Final 8/22/2024 15:07 8/22/2024 15:27     Study Result    Narrative & Impression   Interpreted By:  Karthik Jovel,   STUDY:  BI MAMMO BILATERAL SCREENING TOMOSYNTHESIS;  8/22/2024 3:27 pm      ACCESSION NUMBER(S):  [id number]      ORDERING CLINICIAN:  REYNALDO PENDLETON      INDICATION:  Screening.      COMPARISON:  Multiple prior examinations dating back to 12/07/2015      FINDINGS:  2D and tomosynthesis images were reviewed at 1 mm slice thickness.      Density:  There are areas of scattered fibroglandular tissue.      Scarring with extensive calcifications left breast stable. Prominent  lymph nodes of the left axillary region appearing similar to prior  examination.      Right breast is stable.      No suspicious masses or calcifications are identified.      IMPRESSION:  No mammographic evidence of malignancy.      BI-RADS CATEGORY:  BI-RADS Category:  2 Benign.  Recommendation:  Annual Screening.  Recommended Date:  1 Year.  Laterality:  Bilateral.              For any future breast imaging appointments, please call [phone]).          MACRO:  None      Signed by: Karthik Jovel 8/23/2024 10:17 AM  Dictation workstation:   [id number]         ASSESSMENT / PLAN   Diagnoses and all orders for this visit:  Ductal carcinoma in situ (DCIS) of right breast  Encounter for screening mammogram for breast cancer  -     BI mammo bilateral  screening tomosynthesis; Future      Plan  Aug 2021: RIGHT: DCIS (0.7); ER positive; CA weakly positive s/p lumpectomy with clear margins; declined XRT; Tamoxifen 20 mg p.o. daily started in November 2021      1. No clinical concern for recurrent DCIS or breast cancer.  2. Started on tamoxifen November 2021. Continue for 5 years. Follow-up with medical oncology.  3. Patient declined adjuvant XRT.  4. Patient encouraged to do monthly self breast exams.  5.  Continue with yearly screening mammograms.  Return to the office in 1 year after her next screening mammogram.      Erin Chavez MD, FACS  [facility] General Surgery  [address] [place] [address];   [address]  [place]  [address] [phone]

## 2024-08-28 NOTE — PATIENT INSTRUCTIONS
1. Do monthly self breast exams of both your breast. If you identify any new lumps or concerns, please call Dr. Chavez's office. [phone]  2. Continue taking your tamoxifen daily. Follow-up with medical oncologist as previously scheduled.  3.  You will be due for mammogram of both breast in 1 year.  Follow-up in Dr. Chavez's office after this mammogram.

## 2024-09-05 ENCOUNTER — OFFICE VISIT (OUTPATIENT)
Dept: HEMATOLOGY/ONCOLOGY | Facility: CLINIC | Age: 79
End: 2024-09-05
Payer: MEDICARE

## 2024-09-05 VITALS
SYSTOLIC BLOOD PRESSURE: 130 MMHG | TEMPERATURE: 98.2 F | WEIGHT: 197.42 LBS | RESPIRATION RATE: 16 BRPM | OXYGEN SATURATION: 94 % | BODY MASS INDEX: 33.7 KG/M2 | HEART RATE: 65 BPM | DIASTOLIC BLOOD PRESSURE: 74 MMHG | HEIGHT: 64 IN

## 2024-09-05 DIAGNOSIS — D05.11 DUCTAL CARCINOMA IN SITU (DCIS) OF RIGHT BREAST: ICD-10-CM

## 2024-09-05 LAB
ALBUMIN SERPL BCP-MCNC: 3.6 G/DL (ref 3.4–5)
ALP SERPL-CCNC: 51 U/L (ref 33–136)
ALT SERPL W P-5'-P-CCNC: 21 U/L (ref 7–45)
ANION GAP SERPL CALC-SCNC: 8 MMOL/L (ref 10–20)
AST SERPL W P-5'-P-CCNC: 19 U/L (ref 9–39)
BASOPHILS # BLD AUTO: 0.02 X10*3/UL (ref 0–0.1)
BASOPHILS NFR BLD AUTO: 0.3 %
BILIRUB SERPL-MCNC: 0.7 MG/DL (ref 0–1.2)
BUN SERPL-MCNC: 15 MG/DL (ref 6–23)
CALCIUM SERPL-MCNC: 8.6 MG/DL (ref 8.6–10.3)
CHLORIDE SERPL-SCNC: 106 MMOL/L (ref 98–107)
CO2 SERPL-SCNC: 27 MMOL/L (ref 21–32)
CREAT SERPL-MCNC: 0.73 MG/DL (ref 0.5–1.05)
EGFRCR SERPLBLD CKD-EPI 2021: 85 ML/MIN/1.73M*2
EOSINOPHIL # BLD AUTO: 0.18 X10*3/UL (ref 0–0.4)
EOSINOPHIL NFR BLD AUTO: 2.4 %
ERYTHROCYTE [DISTWIDTH] IN BLOOD BY AUTOMATED COUNT: 13.3 % (ref 11.5–14.5)
GLUCOSE SERPL-MCNC: 141 MG/DL (ref 74–99)
HCT VFR BLD AUTO: 42.9 % (ref 36–46)
HGB BLD-MCNC: 14.3 G/DL (ref 12–16)
IMM GRANULOCYTES # BLD AUTO: 0.02 X10*3/UL (ref 0–0.5)
IMM GRANULOCYTES NFR BLD AUTO: 0.3 % (ref 0–0.9)
LYMPHOCYTES # BLD AUTO: 1.9 X10*3/UL (ref 0.8–3)
LYMPHOCYTES NFR BLD AUTO: 25.8 %
MCH RBC QN AUTO: 30.6 PG (ref 26–34)
MCHC RBC AUTO-ENTMCNC: 33.3 G/DL (ref 32–36)
MCV RBC AUTO: 92 FL (ref 80–100)
MONOCYTES # BLD AUTO: 0.61 X10*3/UL (ref 0.05–0.8)
MONOCYTES NFR BLD AUTO: 8.3 %
NEUTROPHILS # BLD AUTO: 4.63 X10*3/UL (ref 1.6–5.5)
NEUTROPHILS NFR BLD AUTO: 62.9 %
PLATELET # BLD AUTO: 172 X10*3/UL (ref 150–450)
POTASSIUM SERPL-SCNC: 3.6 MMOL/L (ref 3.5–5.3)
PROT SERPL-MCNC: 6 G/DL (ref 6.4–8.2)
RBC # BLD AUTO: 4.67 X10*6/UL (ref 4–5.2)
SODIUM SERPL-SCNC: 137 MMOL/L (ref 136–145)
WBC # BLD AUTO: 7.4 X10*3/UL (ref 4.4–11.3)

## 2024-09-05 PROCEDURE — 36415 COLL VENOUS BLD VENIPUNCTURE: CPT | Performed by: INTERNAL MEDICINE

## 2024-09-05 PROCEDURE — 99214 OFFICE O/P EST MOD 30 MIN: CPT | Performed by: INTERNAL MEDICINE

## 2024-09-05 PROCEDURE — 1159F MED LIST DOCD IN RCRD: CPT | Performed by: INTERNAL MEDICINE

## 2024-09-05 PROCEDURE — 1126F AMNT PAIN NOTED NONE PRSNT: CPT | Performed by: INTERNAL MEDICINE

## 2024-09-05 PROCEDURE — 1160F RVW MEDS BY RX/DR IN RCRD: CPT | Performed by: INTERNAL MEDICINE

## 2024-09-05 PROCEDURE — 3078F DIAST BP <80 MM HG: CPT | Performed by: INTERNAL MEDICINE

## 2024-09-05 PROCEDURE — 3075F SYST BP GE 130 - 139MM HG: CPT | Performed by: INTERNAL MEDICINE

## 2024-09-05 ASSESSMENT — NCCN CANCER DISTRESS MANAGEMENT
NCCN EMOTIONAL CONCERNS: 6
NCCN PHYSICAL CONCERNS: 6

## 2024-09-05 ASSESSMENT — PAIN SCALES - GENERAL: PAINLEVEL: 0-NO PAIN

## 2024-09-05 NOTE — PROGRESS NOTES
Chaperone Present: Declined.  Chaperone Name/Title: EDWARDO CUI MA    Examination Chaperoned: Breast Exam

## 2024-09-05 NOTE — PROGRESS NOTES
Patient Visit Information:   Visit Type: Follow Up Visit      Cancer History:          Breast: Ductal Carcinoma in situ         AJCC Edition: 8th (AJCC), Diagnosis Date: Sep 2021, 0, pTis(DCIS) pNX cM0 G2     Treatment Synopsis:    August 18, 2021, digital mammogram did show calcification of right breast      August 25, 2021, stereotactic biopsy of right breast, pathology positive for ductal carcinoma in situ      September 16, 2021, localized needle lumpectomy of right breast      Final pathology did show ductal carcinoma in situ, measuring 0.7 cm, grade intermediate, solid comedo type with necrosis, margin clean more than 2 mm, ER positive LA positive, associated with atypical ductal hyperplasia      Stage PTis N0 M0      Radiation oncology consultation noted and patient does not want adjuvant radiotherapy because of possible side effect.  Tamoxifen 20 mg p.o. daily, started in November 2021 8/21 2023, mammogram okay   8/22/24 , mammogram negative  History of Present Illness:      ID Statement:    CORINA CR is a 78 year old Female        Chief Complaint: Ductal carcinoma in situ of right  breast   Interval History:    Patient is a 76-year-old female with a history of hypertension, hypercholesterolemia, osteoarthritis went for routine digital mammogram and on August 18, 2021 which  did show calcification of right breast.        August 25, 2021 patient underwent for stereotactic biopsy of right breast final pathology did show ductal carcinoma in situ, intermediate grade.      September 16, 2021, underwent for needle localized lumpectomy and final pathology did show ductal carcinoma in situ, intermediate grade, size 0.7 cm, solid comedo type with necrosis, margins clear more than 2 mm, ER positive LA positive, associated with  atypical ductal hyperplasia.      Medical oncology consultation requested.      Postoperatively patient doing very well.      Family history negative for breast and ovarian cancer.       Medical records reviewed and pathology report discussed with patient.      9/5/24  DCIS of right breast, status post lumpectomy, no radiotherapy, patient is taking tamoxifen 20 mg p.o. daily since November 2021.  Patient doing very well patient a few hot flashes, no arthritis, no muscular pain, no vaginal discharge   8/22/24 mammogram okay  Review of Systems:   Review of Systems:    No headache and dizziness      No chest pain or shortness of breath, no cough      No fever, no night sweats      No nausea vomiting      No abdominal pain      No diarrhea and constipation, no rectal bleeding      No vaginal discharge      Patient has some arthritis      Body weight is stable, still active        Allergies and Intolerances:       Allergies:         Nickel: Environment, Other, Rash, Active         Pollen: Environment, 26-Apr-2012, Other, Active         Tape - Adhesive, Bandaids, Paper: Environment, Rash, Itching,  Active         No Known Drug Allergies: Drug, Unknown, Active     Outpatient Medication Profile:  * Patient Currently Takes Medications as of 05-Sep-2023 08:47 documented in Structured Notes         tamoxifen 20 mg oral tablet : Last Dose Taken:  , 1 tab(s) orally once a day , Start Date: 07-Mar-2022         Ocuvite Adult 50+ Oral Capsule: Last Dose Taken:  , Start Date: 20-Aug-2021         Atorvastatin Calcium 40 MG Oral Tablet: Last Dose Taken:  , TAKE 1 TABLET  BY MOUTH DAILY, Start Date: 24-Sep-2019         amLODIPine Besylate 5 MG Oral Tablet: Last Dose Taken:  , TAKE 1 TABLET  BY MOUTH DAILY, Start Date: 26-Oct-2017         hydroCHLOROthiazide 25 MG Oral Tablet: Last Dose Taken:  , TAKE 1 TABLET  BY MOUTH DAILY, Start Date: 10-Dec-2012         Arthritis meds w/ MSM: Last Dose Taken:  , 1 tab(s) orally once a day         Azelastine HCl - 0.15 % Nasal Solution: Last Dose Taken:           Baby Aspirin CHEW: Last Dose Taken:           Melatonin: Last Dose Taken:  , 10 milligram(s) orally once a day, As  Needed             Medical History:         Ductal carcinoma in situ (DCIS) of right breast with comedonecrosis : ICD-10: D05.11, Status: Active       Surg History:         Status post right hip replacement: ICD-10: Z96.641, Status:  Active         Total hip replacement prosthesis: Status: Active     Family History: No Family History items are recorded  in the problem list.      Social History:   Social Substance History:  ·  Smoking Status never smoker (1)   ·  Alcohol Use denies   ·  Drug Use denies   ·  Additional History     Past medical history: She has a positive NICOLE but did not have any symptoms prior to the test and has not had any sequelae, hypertension, hyperlipidemia, non-insulin-dependent  diabetes mellitus which is diet controlled, COPD.      Past surgical history: Excision of a basal cell carcinoma of her face in August of this year, a benign left breast biopsy many years ago, bilateral total hip arthroplasties and a cholecystectomy.      Social history: She is a  and comes to this appointment alone.  She is a retired nurse.      Gynecologic history: Menarche age 15.  G3, P3.  She delivered her first child at the age of 27.  She went through menopause at the age of 55.  She took oral contraceptive pills for 10 years and hormone replacement therapy for 10 years. (1)           Performance:   ECOG Performance Status: 0 Fully Active         Vitals and Measurements:   Vitals: Temp: 36.8  HR: 62  RR: 16  BP: 141/81  SPO2%:   93   Measurements: HT(cm): 162  WT(kg): 91.1  BSA: 2.02   BMI:  34.7   Last 3 Weights & Heights: Date:                           Weight/Scale Type:                    Height:   05-Sep-2023 08:46                91.1  kg                     162  cm  07-Mar-2023 12:40                93  kg                     162  cm  06-Sep-2022 13:32                91.5  kg                     162  cm      Physical Exam:      Constitutional: awake/alert/oriented x3, no distress,   Eyes: PERRL, EOMI,  clear sclera   ENMT: mucous membranes moist, no apparent injury,   Head/Neck: Neck supple, no apparent injury, thyroid  without mass or tenderness, No JVD, trachea midline, no bruits   Respiratory/Thorax: Patent airways, CTAB, normal  breath sounds   Cardiovascular: Regular, rate and rhythm,  , normal  S 1and S 2   Gastrointestinal: Nondistended, soft, non-tender,   no masses palpable, no organomegaly, +BS,   Extremities: normal extremities, no cyanosis edema,    no clubbing   Neurological: alert and oriented x3, intact senses,  motor, response and reflexes, normal strength   Breast: Right breast examination did show surgical  scar, some tenderness, no mass     Left breast examination within normal limit   Lymphatic: No peripheral lymphadenopathy   Psychological: Appropriate mood and behavior   Skin: Warm and dry, no lesions, no rashes         Lab Results:                9/5/24) 1 mo ago  (7/17/24) 6 mo ago  (3/5/24) 7 mo ago  (1/17/24) 1 yr ago  (9/5/23) 1 yr ago  (7/17/23) 1 yr ago  (3/7/23)    Glucose  74 - 99 mg/dL 141 High  108 High  180 High  110 High  132 High  102 High  127 High    Sodium  136 - 145 mmol/L 137 141 140 141 140 143 139   Potassium  3.5 - 5.3 mmol/L 3.6 4.0 3.7 4.1 3.7 4.0 3.7   Chloride  98 - 107 mmol/L 106 107 106 105 105 106 103   Bicarbonate  21 - 32 mmol/L 27 28 27 27 29 28 24   Anion Gap  10 - 20 mmol/L 8 Low  10 11 13 10 13 16   Urea Nitrogen  6 - 23 mg/dL 15 14 16 14 15 16 13   Creatinine  0.50 - 1.05 mg/dL 0.73 0.68 0.71 0.67 0.71 0.70 0.69   eGFR  >60 mL/min/1.73m*2 85 90 CM 88 CM >90 CM      Comment: Calculations of estimated GFR are performed using the 2021 CKD-EPI Study Refit equation without the race variable for the IDMS-Traceable creatinine methods.  https://jasn.asnjournals.org/content/early/2021/09/22/ASN.0135307523   Calcium  8.6 - 10.3 mg/dL 8.6 9.0 8.8 9.2 9.4 9.3 9.3   Albumin  3.4 - 5.0 g/dL 3.6 3.6 3.5 3.8 3.6 3.7 3.7   Alkaline Phosphatase  33 - 136 U/L 51 49 52 56 55  54 56   Total Protein  6.4 - 8.2 g/dL 6.0 Low  5.7 Low  6.0 Low  6.2 Low  6.4 6.0 Low  6.2 Low    AST  9 - 39 U/L 19 22 22 25 26 26 25   Bilirubin, Total  0.0 - 1.2 mg/dL 0.7 0.7 0.6 0.7 0.6 0.6 0.5   ALT  7 - 45 U/L 21               ·  Results            Radiology Result:     ·  Results           Impression:             Assessment and Plan:      Assessment and Plan:   Assessment:    1.  DCIS of right breast      Patient is a 76-year-old female who was diagnosed DCIS of right breast status post lumpectomy, completely   resected, measuring 0.7 cm, intermediate grade, margin clean, ER positive, MS positive, associated with atypical ductal hyperplasia.      No adjuvant radiotherapy      Tamoxifen 20 mg p.o. daily started in November 2021 9/5/24     1.  DCIS of right breast      Patient is a 77-year-old female who was diagnosed DCIS of right breast status post lumpectomy, completely   resected, measuring 0.7 cm, intermediate grade, margin clean, ER positive, MS positive, associated with atypical ductal hyperplasia.      No adjuvant radiotherapy      Tamoxifen 20 mg p.o. daily started in November 2021        Patient doing very well, lab result discussed with patient      Continue tamoxifen 20 mg p.o. daily      Mammogram within normal limit     Reevaluation after 6 months  Plan:    Plan as above      Discussed with the patient      Time spent 30 minute.

## 2024-09-12 ENCOUNTER — TELEPHONE (OUTPATIENT)
Dept: PRIMARY CARE | Facility: CLINIC | Age: 79
End: 2024-09-12
Payer: MEDICARE

## 2024-09-12 ENCOUNTER — TELEPHONE (OUTPATIENT)
Dept: SURGERY | Facility: CLINIC | Age: 79
End: 2024-09-12
Payer: MEDICARE

## 2024-09-12 NOTE — TELEPHONE ENCOUNTER
tamoxifen (Nolvadex) 20 mg tablet// Take 1 tablet (20 mg total) by mouth once daily.  NICKOLAS YANEZ

## 2024-09-12 NOTE — TELEPHONE ENCOUNTER
Patient call about receiving a refill on prescription Tamoxifen 40 mg. [facility] Patient would like a call back if prescription refill is possible.

## 2024-09-12 NOTE — TELEPHONE ENCOUNTER
tamoxifen (Nolvadex) 20 mg tablet//Take 1 tablet (20 mg total) by mouth once daily.  NICKOLAS YANEZ

## 2024-09-13 ENCOUNTER — TELEPHONE (OUTPATIENT)
Dept: HEMATOLOGY/ONCOLOGY | Facility: CLINIC | Age: 79
End: 2024-09-13
Payer: MEDICARE

## 2024-09-13 DIAGNOSIS — D05.11 DUCTAL CARCINOMA IN SITU (DCIS) OF RIGHT BREAST: Primary | ICD-10-CM

## 2024-09-13 RX ORDER — TAMOXIFEN CITRATE 20 MG/1
20 TABLET ORAL DAILY
Qty: 90 TABLET | Refills: 3 | Status: SHIPPED | OUTPATIENT
Start: 2024-09-13

## 2024-09-13 RX ORDER — TAMOXIFEN CITRATE 20 MG/1
20 TABLET ORAL DAILY
Status: CANCELLED | OUTPATIENT
Start: 2024-09-13

## 2024-09-30 DIAGNOSIS — E87.6 HYPOKALEMIA: ICD-10-CM

## 2024-09-30 RX ORDER — POTASSIUM CHLORIDE 20 MEQ/1
40 TABLET, EXTENDED RELEASE ORAL DAILY
Qty: 180 TABLET | Refills: 3 | Status: SHIPPED | OUTPATIENT
Start: 2024-09-30

## 2024-11-07 ENCOUNTER — HOSPITAL ENCOUNTER (OUTPATIENT)
Dept: GASTROENTEROLOGY | Facility: HOSPITAL | Age: 79
Discharge: HOME | End: 2024-11-07
Payer: MEDICARE

## 2024-11-07 ENCOUNTER — ANESTHESIA (OUTPATIENT)
Dept: GASTROENTEROLOGY | Facility: HOSPITAL | Age: 79
End: 2024-11-07
Payer: MEDICARE

## 2024-11-07 ENCOUNTER — ANESTHESIA EVENT (OUTPATIENT)
Dept: GASTROENTEROLOGY | Facility: HOSPITAL | Age: 79
End: 2024-11-07
Payer: MEDICARE

## 2024-11-07 VITALS
OXYGEN SATURATION: 95 % | RESPIRATION RATE: 17 BRPM | DIASTOLIC BLOOD PRESSURE: 86 MMHG | SYSTOLIC BLOOD PRESSURE: 146 MMHG | HEART RATE: 62 BPM | TEMPERATURE: 98.2 F

## 2024-11-07 DIAGNOSIS — Z12.11 SCREEN FOR COLON CANCER: Primary | ICD-10-CM

## 2024-11-07 DIAGNOSIS — Z86.0100 HX OF COLONIC POLYPS: ICD-10-CM

## 2024-11-07 PROCEDURE — 3700000001 HC GENERAL ANESTHESIA TIME - INITIAL BASE CHARGE

## 2024-11-07 PROCEDURE — 7100000010 HC PHASE TWO TIME - EACH INCREMENTAL 1 MINUTE

## 2024-11-07 PROCEDURE — 45378 DIAGNOSTIC COLONOSCOPY: CPT | Performed by: SURGERY

## 2024-11-07 PROCEDURE — 3700000002 HC GENERAL ANESTHESIA TIME - EACH INCREMENTAL 1 MINUTE

## 2024-11-07 PROCEDURE — 2500000004 HC RX 250 GENERAL PHARMACY W/ HCPCS (ALT 636 FOR OP/ED): Performed by: NURSE ANESTHETIST, CERTIFIED REGISTERED

## 2024-11-07 PROCEDURE — 7100000009 HC PHASE TWO TIME - INITIAL BASE CHARGE

## 2024-11-07 RX ORDER — SODIUM CHLORIDE 0.9 % (FLUSH) 0.9 %
SYRINGE (ML) INJECTION AS NEEDED
Status: DISCONTINUED | OUTPATIENT
Start: 2024-11-07 | End: 2024-11-07

## 2024-11-07 RX ORDER — PROPOFOL 10 MG/ML
INJECTION, EMULSION INTRAVENOUS AS NEEDED
Status: DISCONTINUED | OUTPATIENT
Start: 2024-11-07 | End: 2024-11-07

## 2024-11-07 RX ORDER — LIDOCAINE HCL/PF 100 MG/5ML
SYRINGE (ML) INTRAVENOUS AS NEEDED
Status: DISCONTINUED | OUTPATIENT
Start: 2024-11-07 | End: 2024-11-07

## 2024-11-07 SDOH — HEALTH STABILITY: MENTAL HEALTH: CURRENT SMOKER: 0

## 2024-11-07 ASSESSMENT — PAIN - FUNCTIONAL ASSESSMENT
PAIN_FUNCTIONAL_ASSESSMENT: 0-10

## 2024-11-07 ASSESSMENT — PAIN SCALES - GENERAL
PAINLEVEL_OUTOF10: 0 - NO PAIN

## 2024-11-07 ASSESSMENT — COLUMBIA-SUICIDE SEVERITY RATING SCALE - C-SSRS
2. HAVE YOU ACTUALLY HAD ANY THOUGHTS OF KILLING YOURSELF?: NO
6. HAVE YOU EVER DONE ANYTHING, STARTED TO DO ANYTHING, OR PREPARED TO DO ANYTHING TO END YOUR LIFE?: NO
1. IN THE PAST MONTH, HAVE YOU WISHED YOU WERE DEAD OR WISHED YOU COULD GO TO SLEEP AND NOT WAKE UP?: NO

## 2024-11-07 NOTE — H&P
"History Of Present Illness  Jen Johnson \"Betty" is a 79 y.o. female presenting with hx polyp.     Past Medical History  She has a past medical history of Acute maxillary sinusitis, unspecified (03/02/2017), Breast cancer (Multi) (2022), Contusion of right upper arm, initial encounter (09/18/2018), Fatty (change of) liver, not elsewhere classified (06/18/2019), Localized swelling, mass and lump, head (08/09/2021), Melena (06/22/2015), Other conditions influencing health status, Other conditions influencing health status, Other seborrheic keratosis (10/26/2018), Other specified abnormal findings of blood chemistry (09/12/2019), Pain in right foot (08/02/2021), Pain in unspecified hip (12/18/2018), Personal history of diseases of the blood and blood-forming organs and certain disorders involving the immune mechanism (08/22/2013), Personal history of diseases of the blood and blood-forming organs and certain disorders involving the immune mechanism (06/10/2013), Personal history of other benign neoplasm (09/26/2018), Personal history of other diseases of the digestive system (06/18/2018), Personal history of other diseases of the nervous system and sense organs, Personal history of other endocrine, nutritional and metabolic disease (07/16/2021), Personal history of other endocrine, nutritional and metabolic disease (06/18/2019), Personal history of other specified conditions (12/02/2015), Personal history of urinary (tract) infections (06/24/2015), Personal history of urinary (tract) infections (09/02/2014), Unspecified asthma, uncomplicated ([facility]) (06/18/2018), and Urinary tract infection, site not specified.    Surgical History  She has a past surgical history that includes Total hip arthroplasty (05/17/2013); Colonoscopy (05/17/2013); Anterior cruciate ligament repair (05/17/2013); Cholecystectomy (05/17/2013); Total knee arthroplasty (12/02/2015); Other surgical history (10/01/2021); and BI mammo guided " breast right localization (Right, 9/16/2021).     Social History  She reports that she has never smoked. She has never used smokeless tobacco. She reports that she does not drink alcohol and does not use drugs.    Family History  Family History   Problem Relation Name Age of Onset    Asthma Mother      Diabetes Father      Psoriasis Father      Heart disease Father          Allergies  Patient has no known allergies.    Review of Systems   All other systems reviewed and are negative.       Physical Exam  Vitals reviewed.   Cardiovascular:      Rate and Rhythm: Normal rate and regular rhythm.   Pulmonary:      Breath sounds: Normal breath sounds.   Skin:     General: Skin is warm and dry.   Neurological:      Mental Status: She is alert.   Psychiatric:         Mood and Affect: Mood normal.          Last Recorded Vitals  Blood pressure (!) 186/95, pulse 74, temperature 37.2 °C (98.9 °F), resp. rate 14, SpO2 94%.    Relevant Results               Assessment/Plan   Assessment & Plan  Screen for colon cancer    Hx of colonic polyps      For colo       I spent 15 minutes in the professional and overall care of this patient.      Chivo Armenta MD

## 2024-11-07 NOTE — ANESTHESIA POSTPROCEDURE EVALUATION
"Patient: Jen Johnson \"Vianey\"    Procedure Summary       Date: 11/07/24 Room / Location: [facility]    Anesthesia Start: 0852 Anesthesia Stop: 0927    Procedure: COLONOSCOPY Diagnosis:       Screen for colon cancer      Hx of colonic polyps    Scheduled Providers: Chivo Armenta MD Responsible Provider: CHAD Rodney    Anesthesia Type: MAC ASA Status: 2            Anesthesia Type: MAC    Vitals Value Taken Time   /81 11/07/24 0926   Temp 36.9 °C (98.5 °F) 11/07/24 0926   Pulse 75 11/07/24 0926   Resp 17 11/07/24 0926   SpO2 92 % 11/07/24 0926       Anesthesia Post Evaluation    Patient location during evaluation: bedside  Patient participation: complete - patient participated  Level of consciousness: awake  Pain management: adequate  Airway patency: patent  Cardiovascular status: acceptable  Respiratory status: acceptable  Hydration status: acceptable  Postoperative Nausea and Vomiting: none    There were no known notable events for this encounter.    "

## 2024-11-07 NOTE — ANESTHESIA PREPROCEDURE EVALUATION
"Patient: Jen Johnson \"Vianey\"    Procedure Information       Date/Time: 11/07/24 0845    Scheduled providers: Chivo Armenta MD    Procedure: COLONOSCOPY    Location: [facility]            Relevant Problems   Anesthesia (within normal limits)      Cardiac   (+) Hyperlipidemia   (+) Hypertension      Endocrine   (+) Type 2 diabetes mellitus without complication, without long-term current use of insulin (Multi)       Clinical information reviewed:   Tobacco  Allergies  Meds   Med Hx  Surg Hx   Fam Hx  Soc Hx        NPO Detail:  NPO/Void Status  Carbohydrate Drink Given Prior to Surgery? : N  Date of Last Liquid: 11/07/24  Time of Last Liquid: 0230  Date of Last Solid: 11/04/24  Time of Last Solid: 1830  Last Intake Type: Clear fluids  Time of Last Void: 0700         Physical Exam    Airway  Mallampati: I     Cardiovascular - normal exam     Dental    Pulmonary - normal exam     Abdominal          Anesthesia Plan    History of general anesthesia?: yes  History of complications of general anesthesia?: no    ASA 2     MAC     The patient is not a current smoker.    Anesthetic plan and risks discussed with patient.  Use of blood products discussed with who consented to blood products.      "

## 2024-11-12 ENCOUNTER — TELEPHONE (OUTPATIENT)
Dept: PRIMARY CARE | Facility: CLINIC | Age: 79
End: 2024-11-12
Payer: MEDICARE

## 2024-11-12 DIAGNOSIS — N39.41 URINARY INCONTINENCE, URGE: ICD-10-CM

## 2024-11-12 RX ORDER — SOLIFENACIN SUCCINATE 10 MG/1
10 TABLET, FILM COATED ORAL DAILY
Qty: 90 TABLET | Refills: 3 | Status: SHIPPED | OUTPATIENT
Start: 2024-11-12

## 2024-11-12 NOTE — TELEPHONE ENCOUNTER
solifenacin (VESIcare) 10 mg tablet//Take 1 tablet (10 mg) by mouth once daily.    [facility] #[id number] [place], [place]

## 2024-11-12 NOTE — TELEPHONE ENCOUNTER
solifenacin (VESIcare) 10 mg tablet//Take 1 tablet (10 mg) by mouth once daily.     [facility] #[id number] [place], [place]

## 2024-12-10 ENCOUNTER — TELEPHONE (OUTPATIENT)
Dept: PRIMARY CARE | Facility: CLINIC | Age: 79
End: 2024-12-10
Payer: MEDICARE

## 2024-12-10 NOTE — TELEPHONE ENCOUNTER
tamoxifen (Nolvadex) 20 mg tablet//Take 1 tablet (20 mg total) by mouth once daily.//  [id number] [place], [place]

## 2024-12-16 ENCOUNTER — TELEPHONE (OUTPATIENT)
Dept: HEMATOLOGY/ONCOLOGY | Facility: CLINIC | Age: 79
End: 2024-12-16
Payer: MEDICARE

## 2024-12-16 DIAGNOSIS — D05.11 DUCTAL CARCINOMA IN SITU (DCIS) OF RIGHT BREAST: ICD-10-CM

## 2024-12-17 RX ORDER — TAMOXIFEN CITRATE 20 MG/1
20 TABLET ORAL DAILY
Qty: 90 TABLET | Refills: 3 | Status: SHIPPED | OUTPATIENT
Start: 2024-12-17

## 2025-01-15 ENCOUNTER — APPOINTMENT (OUTPATIENT)
Dept: PRIMARY CARE | Facility: CLINIC | Age: 80
End: 2025-01-15
Payer: MEDICARE

## 2025-01-15 VITALS — SYSTOLIC BLOOD PRESSURE: 138 MMHG | DIASTOLIC BLOOD PRESSURE: 79 MMHG

## 2025-01-15 DIAGNOSIS — Z78.0 POST-MENOPAUSAL: Primary | ICD-10-CM

## 2025-01-15 DIAGNOSIS — I10 PRIMARY HYPERTENSION: ICD-10-CM

## 2025-01-15 DIAGNOSIS — E78.00 PURE HYPERCHOLESTEROLEMIA: ICD-10-CM

## 2025-01-15 DIAGNOSIS — E11.9 TYPE 2 DIABETES MELLITUS WITHOUT COMPLICATION, WITHOUT LONG-TERM CURRENT USE OF INSULIN (MULTI): ICD-10-CM

## 2025-01-15 PROCEDURE — 99214 OFFICE O/P EST MOD 30 MIN: CPT | Performed by: FAMILY MEDICINE

## 2025-01-15 PROCEDURE — 3078F DIAST BP <80 MM HG: CPT | Performed by: FAMILY MEDICINE

## 2025-01-15 PROCEDURE — G2211 COMPLEX E/M VISIT ADD ON: HCPCS | Performed by: FAMILY MEDICINE

## 2025-01-15 PROCEDURE — 1160F RVW MEDS BY RX/DR IN RCRD: CPT | Performed by: FAMILY MEDICINE

## 2025-01-15 PROCEDURE — 1159F MED LIST DOCD IN RCRD: CPT | Performed by: FAMILY MEDICINE

## 2025-01-15 PROCEDURE — 3075F SYST BP GE 130 - 139MM HG: CPT | Performed by: FAMILY MEDICINE

## 2025-01-15 PROCEDURE — 1036F TOBACCO NON-USER: CPT | Performed by: FAMILY MEDICINE

## 2025-01-15 NOTE — ASSESSMENT & PLAN NOTE
DMII, well controlled. Continue life style change. Will monitor A1C, urine albumin. advise eye exam by an OD yearly and checking bilateral feet for skin lesions qhs. Healthy diet and regular exercise. Decrease calorie intake to lose wt.      Orders:    Comprehensive Metabolic Panel; Future    Hemoglobin A1C; Future

## 2025-01-15 NOTE — PROGRESS NOTES
Subjective   Patient ID: Vianey Johnson is a 79 y.o. female who presents for fu    HPI   Patient has been compliant with taking all  current medications. No polydipsia, polyuria or significant weight changes. No lower extremities skin lesion. No LE numbness or tingling. No blurry vision. No GI upset  or muscle ache while on statin for high lipids. Normal appetite. No CP, HA, dizziness, heart palpitation. No claudication or cold LE.  Stable  LE edema. No imbalance or falls. Good mood.     Review of Systems    Objective   /79     Physical Exam  NAD, well groomed, No sclera icterus.  lungs: CTA b/l, heart: RRR, stable LE edema, normal pedal pulses, abd: soft, no tenderness, BS+, normal strength and sensation  at bilateral lower extremities. No skin lesions at bilateral feet.  Good balance. good judgment and memory. No depressed mood.    Assessment/Plan   Assessment & Plan  Post-menopausal    Orders:  •  XR DEXA bone density; Future    Pure hypercholesterolemia  Hyperlipidemia on statin. No GI upset or muscle ache. Will monitor labs for evaluation.  Health diet and regular exercise. Decrease calorie intake to lose wt.  f/u in 6 mos.     Orders:  •  Lipid Panel; Future    Primary hypertension  BP has been controlled. Continue BP pills. keep a daily  bp log and bring in the log at the next office visit. Call office if BP is persistently over 130/80. DASH diet and regular exercise. Decrease calorie intake to lose wt.           Type 2 diabetes mellitus without complication, without long-term current use of insulin (Multi)  DMII, well controlled. Continue life style change. Will monitor A1C, urine albumin. advise eye exam by an OD yearly and checking bilateral feet for skin lesions qhs. Healthy diet and regular exercise. Decrease calorie intake to lose wt.      Orders:  •  Comprehensive Metabolic Panel; Future  •  Hemoglobin A1C; Future

## 2025-01-27 ENCOUNTER — HOSPITAL ENCOUNTER (OUTPATIENT)
Dept: RADIOLOGY | Facility: CLINIC | Age: 80
Discharge: HOME | End: 2025-01-27
Payer: MEDICARE

## 2025-01-27 DIAGNOSIS — Z78.0 POST-MENOPAUSAL: ICD-10-CM

## 2025-01-27 PROCEDURE — 77080 DXA BONE DENSITY AXIAL: CPT | Performed by: RADIOLOGY

## 2025-01-27 PROCEDURE — 77080 DXA BONE DENSITY AXIAL: CPT

## 2025-01-28 LAB
ALBUMIN SERPL-MCNC: 3.8 G/DL (ref 3.6–5.1)
ALBUMIN/GLOB SERPL: 1.6 (CALC) (ref 1–2.5)
ALP SERPL-CCNC: 50 U/L (ref 37–153)
ALT SERPL-CCNC: 17 U/L (ref 6–29)
AST SERPL-CCNC: 18 U/L (ref 10–35)
BILIRUB SERPL-MCNC: 0.4 MG/DL (ref 0.2–1.2)
BUN SERPL-MCNC: 14 MG/DL (ref 7–25)
BUN/CREAT SERPL: 24 (CALC) (ref 6–22)
CALCIUM SERPL-MCNC: 8.8 MG/DL (ref 8.6–10.4)
CHLORIDE SERPL-SCNC: 111 MMOL/L (ref 98–110)
CHOLEST SERPL-MCNC: 113 MG/DL
CHOLEST/HDLC SERPL: 2.8 (CALC)
CO2 SERPL-SCNC: 24 MMOL/L (ref 20–32)
CREAT SERPL-MCNC: 0.59 MG/DL (ref 0.6–1)
EGFRCR SERPLBLD CKD-EPI 2021: 93 ML/MIN/1.73M2
GLOBULIN SER CALC-MCNC: 2.4 G/DL (CALC) (ref 1.9–3.7)
GLUCOSE SERPL-MCNC: 109 MG/DL (ref 65–99)
HBA1C MFR BLD: 6.2 % OF TOTAL HGB
HDLC SERPL-MCNC: 40 MG/DL
LDLC SERPL CALC-MCNC: 55 MG/DL (CALC)
NONHDLC SERPL-MCNC: 73 MG/DL (CALC)
POTASSIUM SERPL-SCNC: 3.6 MMOL/L (ref 3.5–5.3)
PROT SERPL-MCNC: 6.2 G/DL (ref 6.1–8.1)
SODIUM SERPL-SCNC: 146 MMOL/L (ref 135–146)
TRIGL SERPL-MCNC: 97 MG/DL

## 2025-03-04 DIAGNOSIS — D05.11 DUCTAL CARCINOMA IN SITU (DCIS) OF RIGHT BREAST: ICD-10-CM

## 2025-03-05 ENCOUNTER — OFFICE VISIT (OUTPATIENT)
Dept: HEMATOLOGY/ONCOLOGY | Facility: CLINIC | Age: 80
End: 2025-03-05
Payer: MEDICARE

## 2025-03-05 VITALS
TEMPERATURE: 97.9 F | WEIGHT: 199.63 LBS | HEIGHT: 64 IN | SYSTOLIC BLOOD PRESSURE: 146 MMHG | OXYGEN SATURATION: 93 % | HEART RATE: 73 BPM | BODY MASS INDEX: 34.08 KG/M2 | RESPIRATION RATE: 16 BRPM | DIASTOLIC BLOOD PRESSURE: 73 MMHG

## 2025-03-05 DIAGNOSIS — D05.11 DUCTAL CARCINOMA IN SITU (DCIS) OF RIGHT BREAST: ICD-10-CM

## 2025-03-05 LAB
ALBUMIN SERPL BCP-MCNC: 3.6 G/DL (ref 3.4–5)
ALP SERPL-CCNC: 49 U/L (ref 33–136)
ALT SERPL W P-5'-P-CCNC: 17 U/L (ref 7–45)
ANION GAP SERPL CALC-SCNC: 11 MMOL/L (ref 10–20)
AST SERPL W P-5'-P-CCNC: 18 U/L (ref 9–39)
BASOPHILS # BLD AUTO: 0.04 X10*3/UL (ref 0–0.1)
BASOPHILS NFR BLD AUTO: 0.5 %
BILIRUB SERPL-MCNC: 0.4 MG/DL (ref 0–1.2)
BUN SERPL-MCNC: 13 MG/DL (ref 6–23)
CALCIUM SERPL-MCNC: 9 MG/DL (ref 8.6–10.3)
CHLORIDE SERPL-SCNC: 107 MMOL/L (ref 98–107)
CO2 SERPL-SCNC: 27 MMOL/L (ref 21–32)
CREAT SERPL-MCNC: 0.71 MG/DL (ref 0.5–1.05)
EGFRCR SERPLBLD CKD-EPI 2021: 88 ML/MIN/1.73M*2
EOSINOPHIL # BLD AUTO: 0.22 X10*3/UL (ref 0–0.4)
EOSINOPHIL NFR BLD AUTO: 2.9 %
ERYTHROCYTE [DISTWIDTH] IN BLOOD BY AUTOMATED COUNT: 13.4 % (ref 11.5–14.5)
GLUCOSE SERPL-MCNC: 136 MG/DL (ref 74–99)
HCT VFR BLD AUTO: 41.7 % (ref 36–46)
HGB BLD-MCNC: 13.6 G/DL (ref 12–16)
IMM GRANULOCYTES # BLD AUTO: 0.01 X10*3/UL (ref 0–0.5)
IMM GRANULOCYTES NFR BLD AUTO: 0.1 % (ref 0–0.9)
LYMPHOCYTES # BLD AUTO: 2.41 X10*3/UL (ref 0.8–3)
LYMPHOCYTES NFR BLD AUTO: 31.5 %
MCH RBC QN AUTO: 29.9 PG (ref 26–34)
MCHC RBC AUTO-ENTMCNC: 32.6 G/DL (ref 32–36)
MCV RBC AUTO: 92 FL (ref 80–100)
MONOCYTES # BLD AUTO: 0.63 X10*3/UL (ref 0.05–0.8)
MONOCYTES NFR BLD AUTO: 8.2 %
NEUTROPHILS # BLD AUTO: 4.34 X10*3/UL (ref 1.6–5.5)
NEUTROPHILS NFR BLD AUTO: 56.8 %
PLATELET # BLD AUTO: 197 X10*3/UL (ref 150–450)
POTASSIUM SERPL-SCNC: 4 MMOL/L (ref 3.5–5.3)
PROT SERPL-MCNC: 6 G/DL (ref 6.4–8.2)
RBC # BLD AUTO: 4.55 X10*6/UL (ref 4–5.2)
SODIUM SERPL-SCNC: 141 MMOL/L (ref 136–145)
WBC # BLD AUTO: 7.7 X10*3/UL (ref 4.4–11.3)

## 2025-03-05 PROCEDURE — 85025 COMPLETE CBC W/AUTO DIFF WBC: CPT | Performed by: INTERNAL MEDICINE

## 2025-03-05 PROCEDURE — 1036F TOBACCO NON-USER: CPT | Performed by: INTERNAL MEDICINE

## 2025-03-05 PROCEDURE — 1126F AMNT PAIN NOTED NONE PRSNT: CPT | Performed by: INTERNAL MEDICINE

## 2025-03-05 PROCEDURE — 3077F SYST BP >= 140 MM HG: CPT | Performed by: INTERNAL MEDICINE

## 2025-03-05 PROCEDURE — 1160F RVW MEDS BY RX/DR IN RCRD: CPT | Performed by: INTERNAL MEDICINE

## 2025-03-05 PROCEDURE — 1159F MED LIST DOCD IN RCRD: CPT | Performed by: INTERNAL MEDICINE

## 2025-03-05 PROCEDURE — 99213 OFFICE O/P EST LOW 20 MIN: CPT | Performed by: INTERNAL MEDICINE

## 2025-03-05 PROCEDURE — 80053 COMPREHEN METABOLIC PANEL: CPT | Performed by: INTERNAL MEDICINE

## 2025-03-05 PROCEDURE — 3078F DIAST BP <80 MM HG: CPT | Performed by: INTERNAL MEDICINE

## 2025-03-05 PROCEDURE — 36415 COLL VENOUS BLD VENIPUNCTURE: CPT | Performed by: INTERNAL MEDICINE

## 2025-03-05 RX ORDER — TAMOXIFEN CITRATE 20 MG/1
20 TABLET ORAL DAILY
Qty: 90 TABLET | Refills: 3 | Status: SHIPPED | OUTPATIENT
Start: 2025-03-05

## 2025-03-05 ASSESSMENT — PAIN SCALES - GENERAL: PAINLEVEL_OUTOF10: 0-NO PAIN

## 2025-03-05 NOTE — PROGRESS NOTES
Patient Visit Information:   Visit Type: Follow Up Visit      Cancer History:          Breast: Ductal Carcinoma in situ         AJCC Edition: 8th (AJCC), Diagnosis Date: Sep 2021, 0, pTis(DCIS) pNX cM0 G2     Treatment Synopsis:    August 18, 2021, digital mammogram did show calcification of right breast      August 25, 2021, stereotactic biopsy of right breast, pathology positive for ductal carcinoma in situ      September 16, 2021, localized needle lumpectomy of right breast      Final pathology did show ductal carcinoma in situ, measuring 0.7 cm, grade intermediate, solid comedo type with necrosis, margin clean more than 2 mm, ER positive VT positive, associated with atypical ductal hyperplasia      Stage PTis N0 M0      Radiation oncology consultation noted and patient does not want adjuvant radiotherapy because of possible side effect.  Tamoxifen 20 mg p.o. daily, started in November 2021 8/21 2023, mammogram okay   8/22/24 , mammogram negative  History of Present Illness:      ID Statement:    CORINA CR is a 78 year old Female        Chief Complaint: Ductal carcinoma in situ of right  breast   Interval History:    3/5/25  DCIS of right breast, status post lumpectomy, no radiotherapy, patient is taking tamoxifen 20 mg p.o. daily since November 2021.  Patient doing very well patient a few hot flashes, no arthritis, no muscular pain, no vaginal discharge   8/22/24 mammogram okay    HPI  Patient is a 76-year-old female with a history of hypertension, hypercholesterolemia, osteoarthritis went for routine digital mammogram and on August 18, 2021 which  did show calcification of right breast.        August 25, 2021 patient underwent for stereotactic biopsy of right breast final pathology did show ductal carcinoma in situ, intermediate grade.      September 16, 2021, underwent for needle localized lumpectomy and final pathology did show ductal carcinoma in situ, intermediate grade, size 0.7 cm, solid  comedo type with necrosis, margins clear more than 2 mm, ER positive IA positive, associated with  atypical ductal hyperplasia.      Medical oncology consultation requested.      Postoperatively patient doing very well.      Family history negative for breast and ovarian cancer.      Medical records reviewed and pathology report discussed with patient.       Review of Systems:   Review of Systems:    No headache and dizziness      No chest pain or shortness of breath, no cough      No fever, no night sweats      No nausea vomiting      No abdominal pain      No diarrhea and constipation, no rectal bleeding      No vaginal discharge      Patient has some arthritis      Body weight is stable, still active        Allergies and Intolerances:       Allergies:         Nickel: Environment, Other, Rash, Active         Pollen: Environment, 26-Apr-2012, Other, Active         Tape - Adhesive, Bandaids, Paper: Environment, Rash, Itching,  Active         No Known Drug Allergies: Drug, Unknown, Active     Outpatient Medication Profile:  * Patient Currently Takes Medications as of 05-Sep-2023 08:47 documented in Structured Notes         tamoxifen 20 mg oral tablet : Last Dose Taken:  , 1 tab(s) orally once a day , Start Date: 07-Mar-2022         Ocuvite Adult 50+ Oral Capsule: Last Dose Taken:  , Start Date: 20-Aug-2021         Atorvastatin Calcium 40 MG Oral Tablet: Last Dose Taken:  , TAKE 1 TABLET  BY MOUTH DAILY, Start Date: 24-Sep-2019         amLODIPine Besylate 5 MG Oral Tablet: Last Dose Taken:  , TAKE 1 TABLET  BY MOUTH DAILY, Start Date: 26-Oct-2017         hydroCHLOROthiazide 25 MG Oral Tablet: Last Dose Taken:  , TAKE 1 TABLET  BY MOUTH DAILY, Start Date: 10-Dec-2012         Arthritis meds w/ MSM: Last Dose Taken:  , 1 tab(s) orally once a day         Azelastine HCl - 0.15 % Nasal Solution: Last Dose Taken:           Baby Aspirin CHEW: Last Dose Taken:           Melatonin: Last Dose Taken:  , 10 milligram(s) orally  once a day, As Needed             Medical History:         Ductal carcinoma in situ (DCIS) of right breast with comedonecrosis : ICD-10: D05.11, Status: Active       Surg History:         Status post right hip replacement: ICD-10: Z96.641, Status:  Active         Total hip replacement prosthesis: Status: Active     Family History: No Family History items are recorded  in the problem list.      Social History:   Social Substance History:  ·  Smoking Status never smoker (1)   ·  Alcohol Use denies   ·  Drug Use denies   ·  Additional History     Past medical history: She has a positive NICOLE but did not have any symptoms prior to the test and has not had any sequelae, hypertension, hyperlipidemia, non-insulin-dependent  diabetes mellitus which is diet controlled, COPD.      Past surgical history: Excision of a basal cell carcinoma of her face in August of this year, a benign left breast biopsy many years ago, bilateral total hip arthroplasties and a cholecystectomy.      Social history: She is a  and comes to this appointment alone.  She is a retired nurse.      Gynecologic history: Menarche age 15.  G3, P3.  She delivered her first child at the age of 27.  She went through menopause at the age of 55.  She took oral contraceptive pills for 10 years and hormone replacement therapy for 10 years. (1)           Performance:   ECOG Performance Status: 0 Fully Active         Vitals and Measurements:   Vitals: Temp: 36.8  HR: 62  RR: 16  BP: 141/81  SPO2%:   93   Measurements: HT(cm): 162  WT(kg): 91.1  BSA: 2.02   BMI:  34.7   Last 3 Weights & Heights: Date:                           Weight/Scale Type:                    Height:   05-Sep-2023 08:46                91.1  kg                     162  cm  07-Mar-2023 12:40                93  kg                     162  cm  06-Sep-2022 13:32                91.5  kg                     162  cm      Physical Exam:      Constitutional: awake/alert/oriented x3, no distress,    Eyes: PERRL, EOMI, clear sclera   ENMT: mucous membranes moist, no apparent injury,   Head/Neck: Neck supple, no apparent injury, thyroid  without mass or tenderness, No JVD, trachea midline, no bruits   Respiratory/Thorax: Patent airways, CTAB, normal  breath sounds   Cardiovascular: Regular, rate and rhythm,  , normal  S 1and S 2   Gastrointestinal: Nondistended, soft, non-tender,   no masses palpable, no organomegaly, +BS,   Extremities: normal extremities, no cyanosis edema,    no clubbing   Neurological: alert and oriented x3, intact senses,  motor, response and reflexes, normal strength   Breast: Right breast examination did show surgical  scar, some tenderness, no mass     Left breast examination within normal limit   Lymphatic: No peripheral lymphadenopathy   Psychological: Appropriate mood and behavior   Skin: Warm and dry, no lesions, no rashes         Lab Results:        3/5/25) 6 mo ago  (9/5/24) 7 mo ago  (7/17/24) 1 yr ago  (3/5/24) 1 yr ago  (9/5/23) 1 yr ago  (7/17/23) 1 yr ago  (3/7/23)    WBC  4.4 - 11.3 x10*3/uL 7.7 7.4 6.5 6.6 6.8 R 6.6 R 7.1 R   RBC  4.00 - 5.20 x10*6/uL 4.55 4.67 4.60 4.55 4.53 R 4.83 R 4.70 R   Hemoglobin  12.0 - 16.0 g/dL 13.6 14.3 14.1 14.0 14.0 14.8 14.1   Hematocrit  36.0 - 46.0 % 41.7 42.9 42.1 41.5 41.4 44.6 42.2   MCV  80 - 100 fL 92 92 92 91 91 92 90   MCH  26.0 - 34.0 pg 29.9 30.6 30.7 30.8      MCHC  32.0 - 36.0 g/dL 32.6 33.3 33.5 33.7 33.8 33.2 33.4   RDW  11.5 - 14.5 % 13.4 13.3 13.5 13.2 13.1 13.3 13.2   Platelets  150 - 450 x10*3/uL 197             ·  Results            Radiology Result:     ·  Results           Impression:             Assessment and Plan:      Assessment and Plan:   Assessment:    1.  DCIS of right breast      Patient is a 76-year-old female who was diagnosed DCIS of right breast status post lumpectomy, completely   resected, measuring 0.7 cm, intermediate grade, margin clean, ER positive, NV positive, associated with atypical ductal  hyperplasia.      No adjuvant radiotherapy      Tamoxifen 20 mg p.o. daily started in November 2021     3/5/25     1.  DCIS of right breast      Patient is a 77-year-old female who was diagnosed DCIS of right breast status post lumpectomy, completely   resected, measuring 0.7 cm, intermediate grade, margin clean, ER positive, PA positive, associated with atypical ductal hyperplasia.      No adjuvant radiotherapy      Tamoxifen 20 mg p.o. daily started in November 2021        Patient doing very well, lab result discussed with patient      Continue tamoxifen 20 mg p.o. daily      Mammogram on yearly basis     Reevaluation after 6 months  Plan:    Plan as above      Discussed with the patient      Time spent 30 minute.

## 2025-03-05 NOTE — PATIENT INSTRUCTIONS
Follow up visit for history of breast cancer.     Continue tamoxifen    Return in 6 months for follow up.

## 2025-03-19 DIAGNOSIS — I10 PRIMARY HYPERTENSION: ICD-10-CM

## 2025-03-19 DIAGNOSIS — E87.6 HYPOKALEMIA: ICD-10-CM

## 2025-03-19 DIAGNOSIS — N39.41 URINARY INCONTINENCE, URGE: ICD-10-CM

## 2025-03-19 DIAGNOSIS — Z00.00 ROUTINE GENERAL MEDICAL EXAMINATION AT A HEALTH CARE FACILITY: ICD-10-CM

## 2025-03-19 RX ORDER — POTASSIUM CHLORIDE 20 MEQ/1
40 TABLET, EXTENDED RELEASE ORAL DAILY
Qty: 180 TABLET | Refills: 3 | Status: SHIPPED | OUTPATIENT
Start: 2025-03-19

## 2025-03-19 RX ORDER — HYDROCHLOROTHIAZIDE 25 MG/1
25 TABLET ORAL DAILY
Qty: 100 TABLET | Refills: 3 | Status: SHIPPED | OUTPATIENT
Start: 2025-03-19

## 2025-03-19 RX ORDER — AMLODIPINE BESYLATE 5 MG/1
TABLET ORAL
Qty: 100 TABLET | Refills: 3 | Status: SHIPPED | OUTPATIENT
Start: 2025-03-19

## 2025-03-19 RX ORDER — SOLIFENACIN SUCCINATE 10 MG/1
10 TABLET, FILM COATED ORAL DAILY
Qty: 90 TABLET | Refills: 3 | Status: SHIPPED | OUTPATIENT
Start: 2025-03-19

## 2025-03-19 RX ORDER — ATORVASTATIN CALCIUM 40 MG/1
40 TABLET, FILM COATED ORAL DAILY
Qty: 100 TABLET | Refills: 3 | Status: SHIPPED | OUTPATIENT
Start: 2025-03-19

## 2025-04-28 ENCOUNTER — OFFICE VISIT (OUTPATIENT)
Dept: PRIMARY CARE | Facility: CLINIC | Age: 80
End: 2025-04-28
Payer: MEDICARE

## 2025-04-28 ENCOUNTER — HOSPITAL ENCOUNTER (OUTPATIENT)
Dept: RADIOLOGY | Facility: HOSPITAL | Age: 80
Discharge: HOME | End: 2025-04-28
Payer: MEDICARE

## 2025-04-28 VITALS — SYSTOLIC BLOOD PRESSURE: 138 MMHG | DIASTOLIC BLOOD PRESSURE: 78 MMHG | HEART RATE: 82 BPM

## 2025-04-28 DIAGNOSIS — S40.021A CONTUSION OF RIGHT UPPER EXTREMITY, INITIAL ENCOUNTER: ICD-10-CM

## 2025-04-28 DIAGNOSIS — R07.81 RIB PAIN ON RIGHT SIDE: Primary | ICD-10-CM

## 2025-04-28 DIAGNOSIS — R07.81 RIB PAIN ON RIGHT SIDE: ICD-10-CM

## 2025-04-28 PROCEDURE — 1159F MED LIST DOCD IN RCRD: CPT | Performed by: FAMILY MEDICINE

## 2025-04-28 PROCEDURE — 71101 X-RAY EXAM UNILAT RIBS/CHEST: CPT | Mod: RIGHT SIDE | Performed by: RADIOLOGY

## 2025-04-28 PROCEDURE — 3078F DIAST BP <80 MM HG: CPT | Performed by: FAMILY MEDICINE

## 2025-04-28 PROCEDURE — 1036F TOBACCO NON-USER: CPT | Performed by: FAMILY MEDICINE

## 2025-04-28 PROCEDURE — G2211 COMPLEX E/M VISIT ADD ON: HCPCS | Performed by: FAMILY MEDICINE

## 2025-04-28 PROCEDURE — 1160F RVW MEDS BY RX/DR IN RCRD: CPT | Performed by: FAMILY MEDICINE

## 2025-04-28 PROCEDURE — 99214 OFFICE O/P EST MOD 30 MIN: CPT | Performed by: FAMILY MEDICINE

## 2025-04-28 PROCEDURE — 71101 X-RAY EXAM UNILAT RIBS/CHEST: CPT | Mod: RT

## 2025-04-28 PROCEDURE — 3075F SYST BP GE 130 - 139MM HG: CPT | Performed by: FAMILY MEDICINE

## 2025-04-28 NOTE — PROGRESS NOTES
Subjective   Patient ID: Vianey Johnson is a 79 y.o. female who presents for fell on rt side    HPI   fell 1 step down on  rt side 2 days ago causing rt arm  bruise and rt anterior rib pain.  Cough made the pain  worse. No head injury.  No cp, sob, hemoptysis. No HA, dizziness or confusion. No neck stiffness. No incontinence. Good appetite.   Review of Systems    Objective   /78   Pulse 82     Physical Exam    A&Ox3, No acute distress. Eyes:  EMOI, Neck is supple. Lungs: CTA b/l, Heart: RRR, capillary refill was less than 2 seconds. Abdomen: soft, non-tenderness. Bowel sound: normal. Tenderness  at rt anterior rib, no local bruises, Rt arm contusion with tenderness, no tenderness at rt humerus or ulnar/radius.  Normal strength and sensation at ext. Patient walks with a good balance. CNII-XII were grossly intact.    Assessment/Plan   Assessment & Plan  Rib pain on right side  New onset from accidental  fall 2 days ago. Tylenol prn. Check xr. Call office if symptoms do not improve in 7 days or gets worse     Orders:    XR ribs right 2 views w chest pa or ap; Future    Contusion of right upper extremity, initial encounter  Warm compress.

## 2025-07-15 ENCOUNTER — APPOINTMENT (OUTPATIENT)
Dept: PRIMARY CARE | Facility: CLINIC | Age: 80
End: 2025-07-15
Payer: MEDICARE

## 2025-07-15 VITALS
WEIGHT: 197 LBS | HEIGHT: 63 IN | HEART RATE: 80 BPM | SYSTOLIC BLOOD PRESSURE: 126 MMHG | BODY MASS INDEX: 34.91 KG/M2 | DIASTOLIC BLOOD PRESSURE: 71 MMHG

## 2025-07-15 DIAGNOSIS — I10 PRIMARY HYPERTENSION: ICD-10-CM

## 2025-07-15 DIAGNOSIS — E78.00 PURE HYPERCHOLESTEROLEMIA: Primary | ICD-10-CM

## 2025-07-15 DIAGNOSIS — E11.9 TYPE 2 DIABETES MELLITUS WITHOUT COMPLICATION, WITHOUT LONG-TERM CURRENT USE OF INSULIN: ICD-10-CM

## 2025-07-15 DIAGNOSIS — N39.41 URGE INCONTINENCE OF URINE: ICD-10-CM

## 2025-07-15 DIAGNOSIS — Z00.00 GENERAL MEDICAL EXAM: ICD-10-CM

## 2025-07-15 PROCEDURE — 3074F SYST BP LT 130 MM HG: CPT | Performed by: FAMILY MEDICINE

## 2025-07-15 PROCEDURE — 99214 OFFICE O/P EST MOD 30 MIN: CPT | Performed by: FAMILY MEDICINE

## 2025-07-15 PROCEDURE — G0439 PPPS, SUBSEQ VISIT: HCPCS | Performed by: FAMILY MEDICINE

## 2025-07-15 PROCEDURE — 1170F FXNL STATUS ASSESSED: CPT | Performed by: FAMILY MEDICINE

## 2025-07-15 PROCEDURE — 3078F DIAST BP <80 MM HG: CPT | Performed by: FAMILY MEDICINE

## 2025-07-15 PROCEDURE — G2211 COMPLEX E/M VISIT ADD ON: HCPCS | Performed by: FAMILY MEDICINE

## 2025-07-15 ASSESSMENT — ACTIVITIES OF DAILY LIVING (ADL)
TAKING_MEDICATION: INDEPENDENT
GROCERY_SHOPPING: INDEPENDENT
DOING_HOUSEWORK: INDEPENDENT
DRESSING: INDEPENDENT
BATHING: INDEPENDENT
MANAGING_FINANCES: INDEPENDENT

## 2025-07-15 NOTE — ASSESSMENT & PLAN NOTE
Hyperlipidemia on statin. No GI upset or muscle ache. Will monitor labs for evaluation.  Health diet and regular exercise. Decrease calorie intake to lose wt.  f/u in 6 mos.     Orders:    Albumin-Creatinine Ratio, Urine Random; Future    Comprehensive Metabolic Panel; Future    Hemoglobin A1C; Future    Lipid Panel; Future

## 2025-07-15 NOTE — ASSESSMENT & PLAN NOTE
DMII, well controlled. Cont life style change. Will monitor A1C, urine albumin. advise eye exam by an OD yearly and checking bilateral feet for skin lesions qhs. Healthy diet and regular exercise. Decrease calorie intake to lose wt.      Orders:    Albumin-Creatinine Ratio, Urine Random; Future    Comprehensive Metabolic Panel; Future    Hemoglobin A1C; Future    Lipid Panel; Future

## 2025-07-15 NOTE — PROGRESS NOTES
"Subjective   Patient ID: Vianey Johnson is a 80 y.o. female who presents for Medicare Annual Wellness Visit Subsequent (fu).    HPI   Patient has been compliant with taking all  current medications. No polydipsia, polyuria or significant weight changes. No lower extremities skin lesion. No LE numbness or tingling. No blurry vision. No GI upset  or muscle ache while on statin for high lipids. Normal appetite. No CP, HA, dizziness, heart palpitation. No claudication or cold LE.  No LE edema. No  falls. Good mood. Stable urination and nocturia    Review of Systems    Objective   /71   Pulse 80   Ht 1.6 m (5' 3\")   Wt 89.4 kg (197 lb)   BMI 34.90 kg/m²     Physical Exam  NAD, well groomed, No sclera icterus.  lungs: CTA b/l, heart: RRR, No LE edema,  abd: soft, no tenderness, BS+, normal strength and sensation  at bilateral lower extremities. No skin lesions at bilateral feet.  Good balance.  good judgment and memory. No depressed mood.    Assessment/Plan   Assessment & Plan  Pure hypercholesterolemia  Hyperlipidemia on statin. No GI upset or muscle ache. Will monitor labs for evaluation.  Health diet and regular exercise. Decrease calorie intake to lose wt.  f/u in 6 mos.     Orders:    Albumin-Creatinine Ratio, Urine Random; Future    Comprehensive Metabolic Panel; Future    Hemoglobin A1C; Future    Lipid Panel; Future    Primary hypertension  BP has been controlled. Continue BP pills. keep a daily  bp log and bring in the log at the next office visit. Call office if BP is persistently over 130/80. DASH diet and regular exercise. Decrease calorie intake to lose wt.      Orders:    Albumin-Creatinine Ratio, Urine Random; Future    Comprehensive Metabolic Panel; Future    Hemoglobin A1C; Future    Lipid Panel; Future    Type 2 diabetes mellitus without complication, without long-term current use of insulin  DMII, well controlled. Cont life style change. Will monitor A1C, urine albumin. advise eye exam by an " OD yearly and checking bilateral feet for skin lesions qhs. Healthy diet and regular exercise. Decrease calorie intake to lose wt.      Orders:    Albumin-Creatinine Ratio, Urine Random; Future    Comprehensive Metabolic Panel; Future    Hemoglobin A1C; Future    Lipid Panel; Future    Urge incontinence of urine  controlled. Cont the same       General medical exam [Z00.00]  Medicare wellness visit: pt was capable of performing all ADLs and IADLs. Pt has good memory and cognitive function. pt is  obese. Recommend healthy diet and regular exercise.  Advise eye exam by an OD yearly for glaucoma screen and dental exam every 6 months. check lipids.   will monitor blood pressure, cholesterol levels and weight regularly. Recommend sunscreen application if exposed to the sun when the UV index is over 2. Recommend vaccine shots as indicated in the patient's Care Gaps in Epic.  pt declined    Recommend pt to clear clutters on floor at home to prevent falls. recommend to update living will and DPOA  pt  had been taking all current  medications as prescribed.

## 2025-07-15 NOTE — ASSESSMENT & PLAN NOTE
BP has been controlled. Continue BP pills. keep a daily  bp log and bring in the log at the next office visit. Call office if BP is persistently over 130/80. DASH diet and regular exercise. Decrease calorie intake to lose wt.      Orders:    Albumin-Creatinine Ratio, Urine Random; Future    Comprehensive Metabolic Panel; Future    Hemoglobin A1C; Future    Lipid Panel; Future

## 2025-07-19 DIAGNOSIS — E11.9 TYPE 2 DIABETES MELLITUS WITHOUT COMPLICATION, WITHOUT LONG-TERM CURRENT USE OF INSULIN: Primary | ICD-10-CM

## 2025-07-19 LAB
ALBUMIN SERPL-MCNC: 3.9 G/DL (ref 3.6–5.1)
ALBUMIN/CREAT UR: 3 MG/G CREAT
ALP SERPL-CCNC: 54 U/L (ref 37–153)
ALT SERPL-CCNC: 22 U/L (ref 6–29)
ANION GAP SERPL CALCULATED.4IONS-SCNC: 10 MMOL/L (CALC) (ref 7–17)
AST SERPL-CCNC: 22 U/L (ref 10–35)
BILIRUB SERPL-MCNC: 0.7 MG/DL (ref 0.2–1.2)
BUN SERPL-MCNC: 19 MG/DL (ref 7–25)
CALCIUM SERPL-MCNC: 9.4 MG/DL (ref 8.6–10.4)
CHLORIDE SERPL-SCNC: 104 MMOL/L (ref 98–110)
CHOLEST SERPL-MCNC: 112 MG/DL
CHOLEST/HDLC SERPL: 3 (CALC)
CO2 SERPL-SCNC: 28 MMOL/L (ref 20–32)
CREAT SERPL-MCNC: 0.66 MG/DL (ref 0.6–1)
CREAT UR-MCNC: 93 MG/DL (ref 20–275)
EGFRCR SERPLBLD CKD-EPI 2021: 90 ML/MIN/1.73M2
EST. AVERAGE GLUCOSE BLD GHB EST-MCNC: 143 MG/DL
EST. AVERAGE GLUCOSE BLD GHB EST-SCNC: 7.9 MMOL/L
GLUCOSE SERPL-MCNC: 119 MG/DL (ref 65–99)
HBA1C MFR BLD: 6.6 %
HDLC SERPL-MCNC: 37 MG/DL
LDLC SERPL CALC-MCNC: 56 MG/DL (CALC)
MICROALBUMIN UR-MCNC: 0.3 MG/DL
NONHDLC SERPL-MCNC: 75 MG/DL (CALC)
POTASSIUM SERPL-SCNC: 4 MMOL/L (ref 3.5–5.3)
PROT SERPL-MCNC: 6.1 G/DL (ref 6.1–8.1)
SODIUM SERPL-SCNC: 142 MMOL/L (ref 135–146)
TRIGL SERPL-MCNC: 109 MG/DL

## 2025-07-19 RX ORDER — METFORMIN HYDROCHLORIDE 500 MG/1
500 TABLET ORAL
Qty: 100 TABLET | Refills: 3 | Status: SHIPPED | OUTPATIENT
Start: 2025-07-19 | End: 2026-08-23

## 2025-07-25 ENCOUNTER — TELEPHONE (OUTPATIENT)
Dept: PRIMARY CARE | Facility: CLINIC | Age: 80
End: 2025-07-25
Payer: MEDICARE

## 2025-08-29 ENCOUNTER — HOSPITAL ENCOUNTER (OUTPATIENT)
Dept: RADIOLOGY | Facility: HOSPITAL | Age: 80
Discharge: HOME | End: 2025-08-29
Payer: MEDICARE

## 2025-08-29 ENCOUNTER — TELEPHONE (OUTPATIENT)
Dept: PRIMARY CARE | Facility: CLINIC | Age: 80
End: 2025-08-29
Payer: MEDICARE

## 2025-08-29 VITALS — WEIGHT: 197 LBS | HEIGHT: 63 IN | BODY MASS INDEX: 34.91 KG/M2

## 2025-08-29 DIAGNOSIS — Z12.31 ENCOUNTER FOR SCREENING MAMMOGRAM FOR BREAST CANCER: ICD-10-CM

## 2025-08-29 DIAGNOSIS — Z00.00 ROUTINE GENERAL MEDICAL EXAMINATION AT A HEALTH CARE FACILITY: ICD-10-CM

## 2025-08-29 DIAGNOSIS — E87.6 HYPOKALEMIA: ICD-10-CM

## 2025-08-29 PROCEDURE — 77067 SCR MAMMO BI INCL CAD: CPT

## 2025-08-29 RX ORDER — HYDROCHLOROTHIAZIDE 25 MG/1
25 TABLET ORAL DAILY
Qty: 100 TABLET | Refills: 3 | Status: SHIPPED | OUTPATIENT
Start: 2025-08-29

## 2025-09-03 ENCOUNTER — OFFICE VISIT (OUTPATIENT)
Dept: HEMATOLOGY/ONCOLOGY | Facility: CLINIC | Age: 80
End: 2025-09-03
Payer: MEDICARE

## 2025-09-03 ENCOUNTER — TELEPHONE (OUTPATIENT)
Dept: PRIMARY CARE | Facility: CLINIC | Age: 80
End: 2025-09-03

## 2025-09-03 ENCOUNTER — APPOINTMENT (OUTPATIENT)
Dept: SURGERY | Facility: CLINIC | Age: 80
End: 2025-09-03
Payer: MEDICARE

## 2025-09-03 VITALS
DIASTOLIC BLOOD PRESSURE: 78 MMHG | SYSTOLIC BLOOD PRESSURE: 124 MMHG | HEART RATE: 72 BPM | WEIGHT: 198.75 LBS | OXYGEN SATURATION: 98 % | BODY MASS INDEX: 35.21 KG/M2 | TEMPERATURE: 97.9 F | RESPIRATION RATE: 16 BRPM | HEIGHT: 63 IN

## 2025-09-03 DIAGNOSIS — D05.11 DUCTAL CARCINOMA IN SITU (DCIS) OF RIGHT BREAST: ICD-10-CM

## 2025-09-03 PROBLEM — R22.32 ELBOW MASS, LEFT: Status: ACTIVE | Noted: 2025-09-03

## 2025-09-03 LAB
ALBUMIN SERPL BCP-MCNC: 3.8 G/DL (ref 3.4–5)
ALP SERPL-CCNC: 50 U/L (ref 33–136)
ALT SERPL W P-5'-P-CCNC: 23 U/L (ref 7–45)
ANION GAP SERPL CALC-SCNC: 13 MMOL/L (ref 10–20)
AST SERPL W P-5'-P-CCNC: 24 U/L (ref 9–39)
BASOPHILS # BLD AUTO: 0.03 X10*3/UL (ref 0–0.1)
BASOPHILS NFR BLD AUTO: 0.4 %
BILIRUB SERPL-MCNC: 0.6 MG/DL (ref 0–1.2)
BUN SERPL-MCNC: 16 MG/DL (ref 6–23)
CALCIUM SERPL-MCNC: 8.8 MG/DL (ref 8.6–10.3)
CHLORIDE SERPL-SCNC: 103 MMOL/L (ref 98–107)
CO2 SERPL-SCNC: 26 MMOL/L (ref 21–32)
CREAT SERPL-MCNC: 0.68 MG/DL (ref 0.5–1.05)
EGFRCR SERPLBLD CKD-EPI 2021: 89 ML/MIN/1.73M*2
EOSINOPHIL # BLD AUTO: 0.16 X10*3/UL (ref 0–0.4)
EOSINOPHIL NFR BLD AUTO: 2.4 %
ERYTHROCYTE [DISTWIDTH] IN BLOOD BY AUTOMATED COUNT: 13.1 % (ref 11.5–14.5)
GLUCOSE SERPL-MCNC: 144 MG/DL (ref 74–99)
HCT VFR BLD AUTO: 42 % (ref 36–46)
HGB BLD-MCNC: 14.1 G/DL (ref 12–16)
IMM GRANULOCYTES # BLD AUTO: 0 X10*3/UL (ref 0–0.5)
IMM GRANULOCYTES NFR BLD AUTO: 0 % (ref 0–0.9)
LYMPHOCYTES # BLD AUTO: 1.54 X10*3/UL (ref 0.8–3)
LYMPHOCYTES NFR BLD AUTO: 22.8 %
MCH RBC QN AUTO: 30.5 PG (ref 26–34)
MCHC RBC AUTO-ENTMCNC: 33.6 G/DL (ref 32–36)
MCV RBC AUTO: 91 FL (ref 80–100)
MONOCYTES # BLD AUTO: 0.58 X10*3/UL (ref 0.05–0.8)
MONOCYTES NFR BLD AUTO: 8.6 %
NEUTROPHILS # BLD AUTO: 4.45 X10*3/UL (ref 1.6–5.5)
NEUTROPHILS NFR BLD AUTO: 65.8 %
PLATELET # BLD AUTO: 185 X10*3/UL (ref 150–450)
POTASSIUM SERPL-SCNC: 3.3 MMOL/L (ref 3.5–5.3)
PROT SERPL-MCNC: 6.1 G/DL (ref 6.4–8.2)
RBC # BLD AUTO: 4.63 X10*6/UL (ref 4–5.2)
SODIUM SERPL-SCNC: 139 MMOL/L (ref 136–145)
WBC # BLD AUTO: 6.8 X10*3/UL (ref 4.4–11.3)

## 2025-09-03 PROCEDURE — 3074F SYST BP LT 130 MM HG: CPT | Performed by: INTERNAL MEDICINE

## 2025-09-03 PROCEDURE — 1159F MED LIST DOCD IN RCRD: CPT | Performed by: INTERNAL MEDICINE

## 2025-09-03 PROCEDURE — 1036F TOBACCO NON-USER: CPT | Performed by: INTERNAL MEDICINE

## 2025-09-03 PROCEDURE — 99214 OFFICE O/P EST MOD 30 MIN: CPT | Performed by: INTERNAL MEDICINE

## 2025-09-03 PROCEDURE — 1126F AMNT PAIN NOTED NONE PRSNT: CPT | Performed by: INTERNAL MEDICINE

## 2025-09-03 PROCEDURE — 3078F DIAST BP <80 MM HG: CPT | Performed by: INTERNAL MEDICINE

## 2025-09-03 PROCEDURE — 1160F RVW MEDS BY RX/DR IN RCRD: CPT | Performed by: INTERNAL MEDICINE

## 2025-09-03 PROCEDURE — 85025 COMPLETE CBC W/AUTO DIFF WBC: CPT | Performed by: INTERNAL MEDICINE

## 2025-09-03 PROCEDURE — 80053 COMPREHEN METABOLIC PANEL: CPT | Performed by: INTERNAL MEDICINE

## 2025-09-03 PROCEDURE — 36415 COLL VENOUS BLD VENIPUNCTURE: CPT | Performed by: INTERNAL MEDICINE

## 2025-09-03 RX ORDER — POTASSIUM CHLORIDE 20 MEQ/1
40 TABLET, EXTENDED RELEASE ORAL DAILY
Qty: 180 TABLET | Refills: 3 | Status: SHIPPED | OUTPATIENT
Start: 2025-09-03

## 2025-09-03 ASSESSMENT — PAIN SCALES - GENERAL: PAINLEVEL_OUTOF10: 0-NO PAIN

## 2026-01-08 ENCOUNTER — APPOINTMENT (OUTPATIENT)
Dept: PRIMARY CARE | Facility: CLINIC | Age: 81
End: 2026-01-08
Payer: MEDICARE

## 2026-09-09 ENCOUNTER — APPOINTMENT (OUTPATIENT)
Dept: SURGERY | Facility: CLINIC | Age: 81
End: 2026-09-09
Payer: MEDICARE